# Patient Record
Sex: MALE | Race: WHITE | NOT HISPANIC OR LATINO | Employment: OTHER | URBAN - NONMETROPOLITAN AREA
[De-identification: names, ages, dates, MRNs, and addresses within clinical notes are randomized per-mention and may not be internally consistent; named-entity substitution may affect disease eponyms.]

---

## 2023-08-04 ENCOUNTER — APPOINTMENT (OUTPATIENT)
Dept: CT IMAGING | Facility: OTHER | Age: 78
End: 2023-08-04
Attending: PHYSICIAN ASSISTANT
Payer: MEDICARE

## 2023-08-04 ENCOUNTER — HOSPITAL ENCOUNTER (EMERGENCY)
Facility: OTHER | Age: 78
Discharge: HOME | End: 2023-08-04
Attending: PHYSICIAN ASSISTANT | Admitting: PHYSICIAN ASSISTANT
Payer: MEDICARE

## 2023-08-04 ENCOUNTER — APPOINTMENT (OUTPATIENT)
Dept: GENERAL RADIOLOGY | Facility: OTHER | Age: 78
End: 2023-08-04
Attending: PHYSICIAN ASSISTANT
Payer: MEDICARE

## 2023-08-04 VITALS
OXYGEN SATURATION: 99 % | WEIGHT: 220 LBS | HEIGHT: 71 IN | BODY MASS INDEX: 30.8 KG/M2 | TEMPERATURE: 97.5 F | DIASTOLIC BLOOD PRESSURE: 77 MMHG | HEART RATE: 52 BPM | RESPIRATION RATE: 7 BRPM | SYSTOLIC BLOOD PRESSURE: 173 MMHG

## 2023-08-04 DIAGNOSIS — R07.89 ATYPICAL CHEST PAIN: ICD-10-CM

## 2023-08-04 LAB
ALBUMIN SERPL BCG-MCNC: 4.1 G/DL (ref 3.5–5.2)
ALP SERPL-CCNC: 92 U/L (ref 40–129)
ALT SERPL W P-5'-P-CCNC: 14 U/L (ref 0–70)
ANION GAP SERPL CALCULATED.3IONS-SCNC: 8 MMOL/L (ref 7–15)
AST SERPL W P-5'-P-CCNC: 17 U/L (ref 0–45)
BASOPHILS # BLD AUTO: 0.1 10E3/UL (ref 0–0.2)
BASOPHILS NFR BLD AUTO: 1 %
BILIRUB SERPL-MCNC: 0.7 MG/DL
BUN SERPL-MCNC: 17.3 MG/DL (ref 8–23)
CALCIUM SERPL-MCNC: 9.4 MG/DL (ref 8.8–10.2)
CHLORIDE SERPL-SCNC: 104 MMOL/L (ref 98–107)
CREAT SERPL-MCNC: 1.18 MG/DL (ref 0.67–1.17)
D DIMER PPP FEU-MCNC: 1.46 UG/ML FEU (ref 0–0.5)
DEPRECATED HCO3 PLAS-SCNC: 26 MMOL/L (ref 22–29)
EOSINOPHIL # BLD AUTO: 0.4 10E3/UL (ref 0–0.7)
EOSINOPHIL NFR BLD AUTO: 6 %
ERYTHROCYTE [DISTWIDTH] IN BLOOD BY AUTOMATED COUNT: 13.1 % (ref 10–15)
GFR SERPL CREATININE-BSD FRML MDRD: 64 ML/MIN/1.73M2
GLUCOSE SERPL-MCNC: 165 MG/DL (ref 70–99)
HCT VFR BLD AUTO: 37 % (ref 40–53)
HGB BLD-MCNC: 12.8 G/DL (ref 13.3–17.7)
HOLD SPECIMEN: NORMAL
IMM GRANULOCYTES # BLD: 0 10E3/UL
IMM GRANULOCYTES NFR BLD: 0 %
LACTATE SERPL-SCNC: 1 MMOL/L (ref 0.7–2)
LIPASE SERPL-CCNC: 18 U/L (ref 13–60)
LYMPHOCYTES # BLD AUTO: 2.5 10E3/UL (ref 0.8–5.3)
LYMPHOCYTES NFR BLD AUTO: 35 %
MAGNESIUM SERPL-MCNC: 1.8 MG/DL (ref 1.7–2.3)
MCH RBC QN AUTO: 30.3 PG (ref 26.5–33)
MCHC RBC AUTO-ENTMCNC: 34.6 G/DL (ref 31.5–36.5)
MCV RBC AUTO: 88 FL (ref 78–100)
MONOCYTES # BLD AUTO: 0.7 10E3/UL (ref 0–1.3)
MONOCYTES NFR BLD AUTO: 10 %
NEUTROPHILS # BLD AUTO: 3.5 10E3/UL (ref 1.6–8.3)
NEUTROPHILS NFR BLD AUTO: 48 %
NRBC # BLD AUTO: 0 10E3/UL
NRBC BLD AUTO-RTO: 0 /100
NT-PROBNP SERPL-MCNC: 477 PG/ML (ref 0–1800)
PLATELET # BLD AUTO: 205 10E3/UL (ref 150–450)
POTASSIUM SERPL-SCNC: 4.2 MMOL/L (ref 3.4–5.3)
PROT SERPL-MCNC: 7 G/DL (ref 6.4–8.3)
RBC # BLD AUTO: 4.23 10E6/UL (ref 4.4–5.9)
SODIUM SERPL-SCNC: 138 MMOL/L (ref 136–145)
TROPONIN T SERPL HS-MCNC: 20 NG/L
TROPONIN T SERPL HS-MCNC: 21 NG/L
WBC # BLD AUTO: 7.1 10E3/UL (ref 4–11)

## 2023-08-04 PROCEDURE — 83735 ASSAY OF MAGNESIUM: CPT | Performed by: PHYSICIAN ASSISTANT

## 2023-08-04 PROCEDURE — 85379 FIBRIN DEGRADATION QUANT: CPT | Performed by: PHYSICIAN ASSISTANT

## 2023-08-04 PROCEDURE — 71275 CT ANGIOGRAPHY CHEST: CPT | Mod: MA

## 2023-08-04 PROCEDURE — 83690 ASSAY OF LIPASE: CPT | Performed by: PHYSICIAN ASSISTANT

## 2023-08-04 PROCEDURE — 250N000011 HC RX IP 250 OP 636: Performed by: PHYSICIAN ASSISTANT

## 2023-08-04 PROCEDURE — 36415 COLL VENOUS BLD VENIPUNCTURE: CPT | Performed by: PHYSICIAN ASSISTANT

## 2023-08-04 PROCEDURE — 83605 ASSAY OF LACTIC ACID: CPT | Performed by: PHYSICIAN ASSISTANT

## 2023-08-04 PROCEDURE — 83880 ASSAY OF NATRIURETIC PEPTIDE: CPT | Performed by: PHYSICIAN ASSISTANT

## 2023-08-04 PROCEDURE — 84484 ASSAY OF TROPONIN QUANT: CPT | Mod: 91 | Performed by: PHYSICIAN ASSISTANT

## 2023-08-04 PROCEDURE — 85025 COMPLETE CBC W/AUTO DIFF WBC: CPT | Performed by: PHYSICIAN ASSISTANT

## 2023-08-04 PROCEDURE — 93005 ELECTROCARDIOGRAM TRACING: CPT | Performed by: INTERNAL MEDICINE

## 2023-08-04 PROCEDURE — 93010 ELECTROCARDIOGRAM REPORT: CPT | Performed by: INTERNAL MEDICINE

## 2023-08-04 PROCEDURE — 80053 COMPREHEN METABOLIC PANEL: CPT | Performed by: PHYSICIAN ASSISTANT

## 2023-08-04 PROCEDURE — 99284 EMERGENCY DEPT VISIT MOD MDM: CPT | Performed by: INTERNAL MEDICINE

## 2023-08-04 PROCEDURE — 99291 CRITICAL CARE FIRST HOUR: CPT | Mod: 25 | Performed by: INTERNAL MEDICINE

## 2023-08-04 PROCEDURE — 71045 X-RAY EXAM CHEST 1 VIEW: CPT

## 2023-08-04 RX ORDER — FUROSEMIDE 20 MG
TABLET ORAL
COMMUNITY
Start: 2023-01-26 | End: 2023-11-29 | Stop reason: DRUGHIGH

## 2023-08-04 RX ORDER — ISOSORBIDE MONONITRATE 30 MG/1
30 TABLET, EXTENDED RELEASE ORAL DAILY
Qty: 30 TABLET | Refills: 0 | Status: SHIPPED | OUTPATIENT
Start: 2023-08-04 | End: 2024-06-11

## 2023-08-04 RX ORDER — IOPAMIDOL 755 MG/ML
90 INJECTION, SOLUTION INTRAVASCULAR ONCE
Status: COMPLETED | OUTPATIENT
Start: 2023-08-04 | End: 2023-08-04

## 2023-08-04 RX ORDER — LEVOTHYROXINE SODIUM 200 UG/1
TABLET ORAL
COMMUNITY
End: 2024-08-21

## 2023-08-04 RX ORDER — ATENOLOL 100 MG/1
100 TABLET ORAL DAILY
COMMUNITY
End: 2024-02-20

## 2023-08-04 RX ORDER — LOSARTAN POTASSIUM 100 MG/1
100 TABLET ORAL DAILY
COMMUNITY
End: 2023-11-29

## 2023-08-04 RX ORDER — AMLODIPINE BESYLATE 5 MG/1
TABLET ORAL
COMMUNITY
End: 2024-02-29

## 2023-08-04 RX ORDER — INSULIN GLARGINE 100 [IU]/ML
INJECTION, SOLUTION SUBCUTANEOUS
COMMUNITY
Start: 2023-04-20

## 2023-08-04 RX ORDER — NITROGLYCERIN 0.4 MG/1
0.4 TABLET SUBLINGUAL EVERY 5 MIN PRN
COMMUNITY
End: 2024-06-11

## 2023-08-04 RX ORDER — ATORVASTATIN CALCIUM 10 MG/1
10 TABLET, FILM COATED ORAL DAILY
COMMUNITY
End: 2023-11-29 | Stop reason: DRUGHIGH

## 2023-08-04 RX ORDER — METFORMIN HCL 500 MG
1000 TABLET, EXTENDED RELEASE 24 HR ORAL 2 TIMES DAILY WITH MEALS
COMMUNITY
Start: 2022-10-07 | End: 2024-06-11

## 2023-08-04 RX ADMIN — IOPAMIDOL 90 ML: 755 INJECTION, SOLUTION INTRAVENOUS at 14:59

## 2023-08-04 ASSESSMENT — ACTIVITIES OF DAILY LIVING (ADL)
ADLS_ACUITY_SCORE: 35
ADLS_ACUITY_SCORE: 35

## 2023-08-04 NOTE — ED PROVIDER NOTES
"      EMERGENCY DEPARTMENT ENCOUNTER      NAME: Joe Bearden  AGE: 77 year old male  YOB: 1945  MRN: [id number]  EVALUATION DATE & TIME: 8/4/2023  1:42 PM    PCP: No Ref-Primary, Physician    ED PROVIDER: Butch Gaviria PA-C       CHIEF COMPLAINT:  Chief Complaint   Patient presents with    Chest Pain       FINAL IMPRESSION:  1. Atypical chest pain        ED COURSE, MEDICAL DECISION MAKING, ASSESSMENT, AND PLAN:      The patient was interviewed and examined.  HPI and physical exam as below.  Differential diagnosis and MDM Key Documentation Elements as below.  Vitals and triage note were reviewed.  BP (!) 173/77   Pulse 52   Temp 97.5  F (36.4  C) (Temporal)   Resp (!) 7   Ht 1.803 m (5' 11\")   Wt 99.8 kg (220 lb)   SpO2 99%   BMI 30.68 kg/m      Overall Impression/Treatment Plan/Discharge Info/Follow-up/Medical Necessity for Admission:  Joe Bearden is a pleasant 77 year old male with history of type 2 diabetes, hypertension, and MI who presents to the ER today with his daughter for complaints of chest pain.  Patient states that chest pain has been ongoing for the past week.  Patient states that he has sharp pains in the center of his chest.  Patient states that yesterday and the day before chest pain was pretty bad.  Patient states he is also been complaining of dyspnea especially with exertion.  Patient states that symptoms have been improved with use of nitroglycerin.  Patient also feeling sweaty and lightheaded.  Last MI was from 1011 years ago.  Patient does have 2 cardiac stents placed.  No blood thinners.  Last echocardiogram was performed on 12/18/2020 with ejection fraction of 55 to 60%.  Patient states that he is also been having mild swelling in his lower extremities.  Patient does take Lasix for this.  No recent travel.  Does have a follow-up cardiology appointment September here in [place].    Differential includes but is not limited to ACS/MI, unstable angina, " pneumonia, pneumothorax, chest wall pain, thyroid dysfunction, congestive heart failure    Patient today was afebrile with elevated blood pressure.  Patient was in no acute distress.  Resting comfortably on the exam bed.  Patient was not diaphoretic or ill-appearing.  No chest wall tenderness.  Lungs were clear without wheezing, rhonchi, rales.  Heart was regular without murmurs, gallops, or rubs.  Patient with 1+ edema of the lower extremities bilaterally.  No abdominal tenderness.  Exam today otherwise benign.    Initial EKG showed no ST segment deviation to suggest ACS/MI.  Right bundle branch block was present but this was on prior EKG imaging which is not new.  Patient does have a new bifascicular block.  Initial troponin was 21.  Troponin retest at the 2-hour fernando today was 20, trending down, less likely ACS/MI.  Screening BNP was nonelevated.  No leukocytosis.  Patient with creatinine 1.18.  Hemoglobin 12.8.  Glucose 165.  No electrolyte abnormalities.  Normal hepatic function.  Normal lipase.  Normal lactic acid.  CT of the chest for PE study was unremarkable for signs of acute PE, effusion, pneumonia.  No mass lesion seen.  Chest x-ray negative    Assessment/plan:  Atypical chest pain  -Atypical chest pain versus angina.  No signs of acute ACS/MI.  No signs of acute PE or pneumonia.  Low suspicion for referred pain from acute cholecystitis or pancreatitis.  -Recommend emergent follow-up with cardiology for further evaluation for patient's chest pain.  Continue to use nitroglycerin as needed.  Return to the ER over the weekend if any worsening of symptoms.    Addendum: Spoke with cardiologist Dr. Ceron at [facility].  Recommendation is for patient to be started on Imdur 30 mg daily.  Place referral for patient to be seen at [facility].  May be seen in clinic as an outpatient.  I spoke with patient and patient's daughter about plan. Prescription to be sent to [facility].    2.  Type 2 diabetes  -Glucose  165.  No signs of DKA.  Follow-up primary care.    Reassessments, Medications, Interventions, & Response to Treatments:  Multiple reassessments.  Patient not have any worsening of symptoms.  Patient remained stable here in the ER.  Discussed laboratory imaging results.  Discussed treatment plan and follow-up.    Consultations:  None    Decision Rules, Medical Calculators, and Risk Stratification Tools:  None    MDM Key Documentation Elements for Patient's Evaluation:  Differential diagnosis to include high risk considerations: As above  Escalation to admission/observation considered: Admission/observation considered, but patient does not meet admission criteria  Discussions and management with other clinicians:    3a. Independent interpretation of testing performed by another health professional:  -No  3b. Discussion of management or test interpretation with another health professional: -No  Independent interpretation of tests:  Ordering and/or review of 3+ test(s)  Discussion of test interpretations with radiology:  No  History obtained from source other than patient or assessment requiring an independent historian:  No  Review of non-ED/external records:  review of 3+ records  Diagnostic tests considered but not ultimately performed/deferred:  -Echocardiogram  Prescription medications considered but not prescribed:  -Antibiotics  Chronic conditions affecting care:  -Coronary artery disease  Care affected by social determinants of health:  -None    The patient's management involved:   - Laboratory studies  - Imaging studies      Pertinent Labs & Imaging studies reviewed. (See chart for details)  Results for orders placed or performed during the hospital encounter of 08/04/23   XR Chest Port 1 View    Impression    Impression:   No acute abnormality. No evidence of acute or active cardiopulmonary  disease.    LUCILA BYERS MD         SYSTEM ID:  [id number]   CT Chest Pulmonary Embolism w Contrast    Impression     IMPRESSION:   Healing fractures in the anterior aspect of the left third rib and  right fourth rib.    No pneumothorax.    No evidence of pulmonary embolus.      This facility minimizes radiation dose by adjusting the mA and/or kV  according to each patient size.      This CT scan was performed using one or more the following dose  reduction techniques:    -Automated exposure control,  -Adjustment of the mA and/or kV according to patient's size, and/or,  -Use of iterative reconstruction technique.    LUCILA BYERS MD         SYSTEM ID:  [id number]   D dimer quantitative   Result Value Ref Range    D-Dimer Quantitative 1.46 (H) 0.00 - 0.50 ug/mL FEU   Comprehensive metabolic panel   Result Value Ref Range    Sodium 138 136 - 145 mmol/L    Potassium 4.2 3.4 - 5.3 mmol/L    Chloride 104 98 - 107 mmol/L    Carbon Dioxide (CO2) 26 22 - 29 mmol/L    Anion Gap 8 7 - 15 mmol/L    Urea Nitrogen 17.3 8.0 - 23.0 mg/dL    Creatinine 1.18 (H) 0.67 - 1.17 mg/dL    Calcium 9.4 8.8 - 10.2 mg/dL    Glucose 165 (H) 70 - 99 mg/dL    Alkaline Phosphatase 92 40 - 129 U/L    AST 17 0 - 45 U/L    ALT 14 0 - 70 U/L    Protein Total 7.0 6.4 - 8.3 g/dL    Albumin 4.1 3.5 - 5.2 g/dL    Bilirubin Total 0.7 <=1.2 mg/dL    GFR Estimate 64 >60 mL/min/1.73m2   Result Value Ref Range    Lipase 18 13 - 60 U/L   Lactic acid whole blood   Result Value Ref Range    Lactic Acid 1.0 0.7 - 2.0 mmol/L   Result Value Ref Range    Troponin T, High Sensitivity 21 <=22 ng/L   Result Value Ref Range    Magnesium 1.8 1.7 - 2.3 mg/dL   Nt probnp inpatient (BNP)   Result Value Ref Range    N terminal Pro BNP Inpatient 477 0 - 1,800 pg/mL   CBC with platelets and differential   Result Value Ref Range    WBC Count 7.1 4.0 - 11.0 10e3/uL    RBC Count 4.23 (L) 4.40 - 5.90 10e6/uL    Hemoglobin 12.8 (L) 13.3 - 17.7 g/dL    Hematocrit 37.0 (L) 40.0 - 53.0 %    MCV 88 78 - 100 fL    MCH 30.3 26.5 - 33.0 pg    MCHC 34.6 31.5 - 36.5 g/dL    RDW 13.1 10.0 - 15.0 %     Platelet Count 205 150 - 450 10e3/uL    % Neutrophils 48 %    % Lymphocytes 35 %    % Monocytes 10 %    % Eosinophils 6 %    % Basophils 1 %    % Immature Granulocytes 0 %    NRBCs per 100 WBC 0 <1 /100    Absolute Neutrophils 3.5 1.6 - 8.3 10e3/uL    Absolute Lymphocytes 2.5 0.8 - 5.3 10e3/uL    Absolute Monocytes 0.7 0.0 - 1.3 10e3/uL    Absolute Eosinophils 0.4 0.0 - 0.7 10e3/uL    Absolute Basophils 0.1 0.0 - 0.2 10e3/uL    Absolute Immature Granulocytes 0.0 <=0.4 10e3/uL    Absolute NRBCs 0.0 10e3/uL   Extra Red Top Tube   Result Value Ref Range    Hold Specimen x    Result Value Ref Range    Troponin T, High Sensitivity 20 <=22 ng/L     No results found for: ABORH      A shared decision making model was used. Time was taken to answer all questions.  Patient and/or associated parties understood and were agreeable to treatment plan.  Plan and all results were discussed. Warning signs and close return precautions to return to the ED given. Copy of results given. Discharged in stable condition. Discharged with discharge instructions outlining plan for further care and follow up.        PPE worn during patient evaluation:  Mask: Yes, surgical  Eye Protection: No  Gown: No  Hair cover: No  Face Shield: No  Patient wearing a mask: yes      MEDICATIONS GIVEN IN THE EMERGENCY:  Medications   iopamidol (ISOVUE-370) solution 90 mL (90 mLs Intravenous $Given 8/4/23 9724)       NEW PRESCRIPTIONS STARTED AT TODAY'S ER VISIT:  New Prescriptions    No medications on file          =================================================================    HPI  Joe Bearden is a pleasant 77 year old male with history of type 2 diabetes, hypertension, and MI who presents to the ER today with his daughter for complaints of chest pain.  Patient states that chest pain has been ongoing for the past week.  Patient states that he has sharp pains in the center of his chest.  Patient states that yesterday and the day before chest pain was  "pretty bad.  Patient states he is also been complaining of dyspnea especially with exertion.  Patient states that symptoms have been improved with use of nitroglycerin.  Patient also feeling sweaty and lightheaded.  Last MI was from 1011 years ago.  Patient does have 2 cardiac stents placed.  No blood thinners.  Last echocardiogram was performed on 12/18/2020 with ejection fraction of 55 to 60%.  Patient states that he is also been having mild swelling in his lower extremities.  Patient does take Lasix for this.  No recent travel.  Does have a follow-up cardiology appointment September here in [place].      REVIEW OF SYSTEMS   Review of Systems  As above, otherwise ROS is unremarkable.      PAST MEDICAL HISTORY:  No past medical history on file.    PAST SURGICAL HISTORY:  No past surgical history on file.    CURRENT MEDICATIONS:    Current Outpatient Medications   Medication Instructions    amLODIPine (NORVASC) 5 MG tablet TAKE 1 TABLET (5 MG) BY MOUTH TWO TIMES DAILY.    atenolol (TENORMIN) 100 mg, Oral, DAILY    atorvastatin (LIPITOR) 10 mg, Oral, DAILY    furosemide (LASIX) 20 MG tablet TAKE 1 TABLET (20 MG) BY MOUTH EVERY MORNING. THIS IS 20 MG, NOT 40. DO NOT NEED TO CUT THIS.    insulin glargine (BASAGLAR KWIKPEN) 100 UNIT/ML pen INJECT 10 UNITS SUBCUTANEOUS BEFORE BEDTIME. REPLACES MIXED INSULIN, START BASAGLAR WHEN STARTING TRULICITY    levothyroxine (SYNTHROID/LEVOTHROID) 200 MCG tablet TAKE 1 TABLET (200 MCG) BY MOUTH BEFORE BREAKFAST.    losartan (COZAAR) 100 mg, Oral, DAILY    metFORMIN (GLUCOPHAGE XR) 1,000 mg, Oral, 2 TIMES DAILY WITH MEALS       ALLERGIES:  Allergies   Allergen Reactions    Gabapentin GI Disturbance       FAMILY HISTORY:  No family history on file.    SOCIAL HISTORY:   Social History     Socioeconomic History    Marital status:        PHYSICAL EXAM    VITAL SIGNS: BP (!) 173/77   Pulse 52   Temp 97.5  F (36.4  C) (Temporal)   Resp (!) 7   Ht 1.803 m (5' 11\")   Wt 99.8 " "kg (220 lb)   SpO2 99%   BMI 30.68 kg/m      Patient Vitals for the past 24 hrs:   BP Temp Temp src Pulse Resp SpO2 Height Weight   08/04/23 1700 (!) 173/77 -- -- 52 (!) 7 99 % -- --   08/04/23 1600 (!) 178/87 -- -- 50 16 98 % -- --   08/04/23 1530 (!) 181/81 -- -- 50 -- 96 % -- --   08/04/23 1515 (!) 176/74 -- -- 50 18 93 % -- --   08/04/23 1507 (!) 172/58 -- -- 54 10 -- -- --   08/04/23 1344 (!) 183/76 97.5  F (36.4  C) Temporal 58 16 96 % 1.803 m (5' 11\") 99.8 kg (220 lb)       Physical Exam         LABS & RADIOLOGY:  All pertinent labs reviewed and interpreted. Reviewed all pertinent imaging. Please see official radiology report.  Results for orders placed or performed during the hospital encounter of 08/04/23   XR Chest Port 1 View    Impression    Impression:   No acute abnormality. No evidence of acute or active cardiopulmonary  disease.    LUCILA BYERS MD         SYSTEM ID:  [id number]   CT Chest Pulmonary Embolism w Contrast    Impression    IMPRESSION:   Healing fractures in the anterior aspect of the left third rib and  right fourth rib.    No pneumothorax.    No evidence of pulmonary embolus.      This facility minimizes radiation dose by adjusting the mA and/or kV  according to each patient size.      This CT scan was performed using one or more the following dose  reduction techniques:    -Automated exposure control,  -Adjustment of the mA and/or kV according to patient's size, and/or,  -Use of iterative reconstruction technique.    LUCILA BYERS MD         SYSTEM ID:  [id number]   D dimer quantitative   Result Value Ref Range    D-Dimer Quantitative 1.46 (H) 0.00 - 0.50 ug/mL FEU   Comprehensive metabolic panel   Result Value Ref Range    Sodium 138 136 - 145 mmol/L    Potassium 4.2 3.4 - 5.3 mmol/L    Chloride 104 98 - 107 mmol/L    Carbon Dioxide (CO2) 26 22 - 29 mmol/L    Anion Gap 8 7 - 15 mmol/L    Urea Nitrogen 17.3 8.0 - 23.0 mg/dL    Creatinine 1.18 (H) 0.67 - 1.17 mg/dL    Calcium 9.4 " 8.8 - 10.2 mg/dL    Glucose 165 (H) 70 - 99 mg/dL    Alkaline Phosphatase 92 40 - 129 U/L    AST 17 0 - 45 U/L    ALT 14 0 - 70 U/L    Protein Total 7.0 6.4 - 8.3 g/dL    Albumin 4.1 3.5 - 5.2 g/dL    Bilirubin Total 0.7 <=1.2 mg/dL    GFR Estimate 64 >60 mL/min/1.73m2   Result Value Ref Range    Lipase 18 13 - 60 U/L   Lactic acid whole blood   Result Value Ref Range    Lactic Acid 1.0 0.7 - 2.0 mmol/L   Result Value Ref Range    Troponin T, High Sensitivity 21 <=22 ng/L   Result Value Ref Range    Magnesium 1.8 1.7 - 2.3 mg/dL   Nt probnp inpatient (BNP)   Result Value Ref Range    N terminal Pro BNP Inpatient 477 0 - 1,800 pg/mL   CBC with platelets and differential   Result Value Ref Range    WBC Count 7.1 4.0 - 11.0 10e3/uL    RBC Count 4.23 (L) 4.40 - 5.90 10e6/uL    Hemoglobin 12.8 (L) 13.3 - 17.7 g/dL    Hematocrit 37.0 (L) 40.0 - 53.0 %    MCV 88 78 - 100 fL    MCH 30.3 26.5 - 33.0 pg    MCHC 34.6 31.5 - 36.5 g/dL    RDW 13.1 10.0 - 15.0 %    Platelet Count 205 150 - 450 10e3/uL    % Neutrophils 48 %    % Lymphocytes 35 %    % Monocytes 10 %    % Eosinophils 6 %    % Basophils 1 %    % Immature Granulocytes 0 %    NRBCs per 100 WBC 0 <1 /100    Absolute Neutrophils 3.5 1.6 - 8.3 10e3/uL    Absolute Lymphocytes 2.5 0.8 - 5.3 10e3/uL    Absolute Monocytes 0.7 0.0 - 1.3 10e3/uL    Absolute Eosinophils 0.4 0.0 - 0.7 10e3/uL    Absolute Basophils 0.1 0.0 - 0.2 10e3/uL    Absolute Immature Granulocytes 0.0 <=0.4 10e3/uL    Absolute NRBCs 0.0 10e3/uL   Extra Red Top Tube   Result Value Ref Range    Hold Specimen x    Result Value Ref Range    Troponin T, High Sensitivity 20 <=22 ng/L     CT Chest Pulmonary Embolism w Contrast   Final Result   IMPRESSION:    Healing fractures in the anterior aspect of the left third rib and   right fourth rib.      No pneumothorax.      No evidence of pulmonary embolus.         This facility minimizes radiation dose by adjusting the mA and/or kV   according to each patient size.          This CT scan was performed using one or more the following dose   reduction techniques:      -Automated exposure control,   -Adjustment of the mA and/or kV according to patient's size, and/or,   -Use of iterative reconstruction technique.      LUCILA BYERS MD            SYSTEM ID:  [id number]      XR Chest Port 1 View   Final Result   Impression:    No acute abnormality. No evidence of acute or active cardiopulmonary   disease.      LUCILA BYERS MD            SYSTEM ID:  [id number]            EK2020 EKG available for comparison. EKG reviewed at 1351.  1) Rhythm: Sinus bradycardia  2) Rate: ventricular rate 55 bpm.  3) QRS Axis: No axis deviation  4) P waves/ MI interval: Sinus. Nml JORDAN. No atrial enlargement  5) QRS complex: Narrow. Right BBB (not new). Potential left ventricular hypertrophy. No pathological Q waves.  Bifascicular block (new)  6) ST Segment: No acute ST segment elevation or depression  7) T waves: No T wave inversions. No peaked or flattened T waves.     I have independently reviewed and interpreted today's EKG, pending cardiologist over read.         I, Juwan Gaviria PA-C, personally performed the services described in this documentation, and it is both accurate and complete.       Butch Gaviria PA-C  23 [id number]       Butch Gaviria PA-C  23

## 2023-08-04 NOTE — PROGRESS NOTES
1.  Has the patient had a previous reaction to IV contrast? No    2.  Does the patient have kidney disease? No    3.  Is the patient on dialysis? No    If YES to any of these questions, exam will be reviewed with a Radiologist before administering contrast.  IV Contrast- Discharge Instructions After Your CT Scan      The IV contrast you received today will be filtered from your bloodstream by your kidneys during the next 24 hours and pass from the body in urine.  You will not be aware of this process and your urine will not change in color.  To help this process you should drink at least 4 additional glasses of water or juice today.  This reduces stress on your kidneys.    Most contrast reactions are immediate.  Should you develop symptoms of concern after discharge, contact the department at the number below.  After hours you should contact your personal physician.  If you develop breathing distress or wheezing, call 911.

## 2023-08-04 NOTE — DISCHARGE INSTRUCTIONS
-Follow-up with cardiology soon as possible, call Monday for appointment.  I did place an emergency referral for follow-up.  -Recommend no exertional activities until otherwise cleared by cardiology.  There is no signs of acute heart attack today and no signs of blood clots in your lung, pneumonia, or any abnormal findings on your work-up.  -Continue to follow-up with your primary care doctor for management of your diabetes.

## 2023-08-04 NOTE — ED TRIAGE NOTES
Pt presents to Ed with c/o chest pain. Intermittent. Pt reports worsening pain with activity. Pt has been taking nitroglycerin. Pt reports h/o MI and stent placement.    Patria Bailey RN on 8/4/2023 at 1:49 PM       Triage Assessment       Row Name 08/04/23 1348       Skin Circulation/Temperature WDL    Skin Circulation/Temperature WDL WDL       Cardiac WDL    Cardiac WDL X;all       Chest Pain Assessment    Chest Pain Location midsternal    Chest Pain Radiation arm    Character aching    Associated Signs/Symptoms diaphoresis    Chest Pain Intervention cardiac monitor placed;12-lead ECG obtained;activity minimized

## 2023-08-09 LAB
ATRIAL RATE - MUSE: 55 BPM
DIASTOLIC BLOOD PRESSURE - MUSE: NORMAL MMHG
INTERPRETATION ECG - MUSE: NORMAL
P AXIS - MUSE: 45 DEGREES
PR INTERVAL - MUSE: 190 MS
QRS DURATION - MUSE: 176 MS
QT - MUSE: 502 MS
QTC - MUSE: 480 MS
R AXIS - MUSE: -66 DEGREES
SYSTOLIC BLOOD PRESSURE - MUSE: NORMAL MMHG
T AXIS - MUSE: -2 DEGREES
VENTRICULAR RATE- MUSE: 55 BPM

## 2023-08-18 ENCOUNTER — TRANSFERRED RECORDS (OUTPATIENT)
Dept: HEALTH INFORMATION MANAGEMENT | Facility: OTHER | Age: 78
End: 2023-08-18

## 2023-10-06 ENCOUNTER — TRANSFERRED RECORDS (OUTPATIENT)
Dept: HEALTH INFORMATION MANAGEMENT | Facility: OTHER | Age: 78
End: 2023-10-06

## 2023-10-06 LAB — EJECTION FRACTION: =>55 %

## 2023-11-29 ENCOUNTER — OFFICE VISIT (OUTPATIENT)
Dept: FAMILY MEDICINE | Facility: OTHER | Age: 78
End: 2023-11-29
Attending: STUDENT IN AN ORGANIZED HEALTH CARE EDUCATION/TRAINING PROGRAM
Payer: COMMERCIAL

## 2023-11-29 VITALS
OXYGEN SATURATION: 97 % | RESPIRATION RATE: 16 BRPM | TEMPERATURE: 97 F | SYSTOLIC BLOOD PRESSURE: 128 MMHG | WEIGHT: 230 LBS | DIASTOLIC BLOOD PRESSURE: 70 MMHG | BODY MASS INDEX: 32.2 KG/M2 | HEIGHT: 71 IN | HEART RATE: 45 BPM

## 2023-11-29 DIAGNOSIS — G47.411 PRIMARY NARCOLEPSY WITH CATAPLEXY: ICD-10-CM

## 2023-11-29 DIAGNOSIS — Z79.4 TYPE 2 DIABETES MELLITUS WITH OTHER SPECIFIED COMPLICATION, WITH LONG-TERM CURRENT USE OF INSULIN (H): Primary | ICD-10-CM

## 2023-11-29 DIAGNOSIS — Z79.4 TYPE 2 DIABETES MELLITUS WITHOUT COMPLICATION, WITH LONG-TERM CURRENT USE OF INSULIN (H): ICD-10-CM

## 2023-11-29 DIAGNOSIS — E11.9 TYPE 2 DIABETES MELLITUS WITHOUT COMPLICATION, WITH LONG-TERM CURRENT USE OF INSULIN (H): ICD-10-CM

## 2023-11-29 DIAGNOSIS — E11.69 TYPE 2 DIABETES MELLITUS WITH OTHER SPECIFIED COMPLICATION, WITH LONG-TERM CURRENT USE OF INSULIN (H): Primary | ICD-10-CM

## 2023-11-29 DIAGNOSIS — I10 PRIMARY HYPERTENSION: ICD-10-CM

## 2023-11-29 DIAGNOSIS — G21.9 SECONDARY PARKINSONISM, UNSPECIFIED SECONDARY PARKINSONISM TYPE (H): ICD-10-CM

## 2023-11-29 DIAGNOSIS — M10.9 GOUT, UNSPECIFIED CAUSE, UNSPECIFIED CHRONICITY, UNSPECIFIED SITE: ICD-10-CM

## 2023-11-29 DIAGNOSIS — F11.90 CHRONIC, CONTINUOUS USE OF OPIOIDS: ICD-10-CM

## 2023-11-29 DIAGNOSIS — E11.51 TYPE 2 DIABETES MELLITUS WITH PERIPHERAL VASCULAR DISEASE (H): ICD-10-CM

## 2023-11-29 DIAGNOSIS — F90.2 ATTENTION DEFICIT HYPERACTIVITY DISORDER (ADHD), COMBINED TYPE: ICD-10-CM

## 2023-11-29 DIAGNOSIS — F22 DELUSIONAL DISORDER (H): ICD-10-CM

## 2023-11-29 DIAGNOSIS — F11.20 CHRONIC NARCOTIC DEPENDENCE (H): ICD-10-CM

## 2023-11-29 DIAGNOSIS — G89.4 CHRONIC PAIN SYNDROME: ICD-10-CM

## 2023-11-29 DIAGNOSIS — E66.01 MORBID (SEVERE) OBESITY DUE TO EXCESS CALORIES (H): ICD-10-CM

## 2023-11-29 LAB
ALBUMIN SERPL BCG-MCNC: 4.3 G/DL (ref 3.5–5.2)
ALP SERPL-CCNC: 93 U/L (ref 40–150)
ALT SERPL W P-5'-P-CCNC: 19 U/L (ref 0–70)
ANION GAP SERPL CALCULATED.3IONS-SCNC: 9 MMOL/L (ref 7–15)
AST SERPL W P-5'-P-CCNC: 18 U/L (ref 0–45)
BASOPHILS # BLD AUTO: 0.1 10E3/UL (ref 0–0.2)
BASOPHILS NFR BLD AUTO: 1 %
BILIRUB SERPL-MCNC: 0.5 MG/DL
BUN SERPL-MCNC: 19.1 MG/DL (ref 8–23)
CALCIUM SERPL-MCNC: 9.1 MG/DL (ref 8.8–10.2)
CHLORIDE SERPL-SCNC: 104 MMOL/L (ref 98–107)
CHOLEST SERPL-MCNC: 121 MG/DL
CREAT SERPL-MCNC: 1.24 MG/DL (ref 0.67–1.17)
CREAT UR-MCNC: 234.2 MG/DL
DEPRECATED HCO3 PLAS-SCNC: 26 MMOL/L (ref 22–29)
EGFRCR SERPLBLD CKD-EPI 2021: 60 ML/MIN/1.73M2
EOSINOPHIL # BLD AUTO: 0.4 10E3/UL (ref 0–0.7)
EOSINOPHIL NFR BLD AUTO: 5 %
ERYTHROCYTE [DISTWIDTH] IN BLOOD BY AUTOMATED COUNT: 12.7 % (ref 10–15)
GLUCOSE SERPL-MCNC: 161 MG/DL (ref 70–99)
HBA1C MFR BLD: 6.9 % (ref 4–6.2)
HCT VFR BLD AUTO: 36.2 % (ref 40–53)
HDLC SERPL-MCNC: 49 MG/DL
HGB BLD-MCNC: 12.8 G/DL (ref 13.3–17.7)
IMM GRANULOCYTES # BLD: 0 10E3/UL
IMM GRANULOCYTES NFR BLD: 0 %
LDLC SERPL CALC-MCNC: 54 MG/DL
LYMPHOCYTES # BLD AUTO: 2.3 10E3/UL (ref 0.8–5.3)
LYMPHOCYTES NFR BLD AUTO: 29 %
MCH RBC QN AUTO: 31.1 PG (ref 26.5–33)
MCHC RBC AUTO-ENTMCNC: 35.4 G/DL (ref 31.5–36.5)
MCV RBC AUTO: 88 FL (ref 78–100)
MICROALBUMIN UR-MCNC: 123.8 MG/L
MICROALBUMIN/CREAT UR: 52.86 MG/G CR (ref 0–17)
MONOCYTES # BLD AUTO: 0.6 10E3/UL (ref 0–1.3)
MONOCYTES NFR BLD AUTO: 7 %
NEUTROPHILS # BLD AUTO: 4.6 10E3/UL (ref 1.6–8.3)
NEUTROPHILS NFR BLD AUTO: 58 %
NONHDLC SERPL-MCNC: 72 MG/DL
NRBC # BLD AUTO: 0 10E3/UL
NRBC BLD AUTO-RTO: 0 /100
PLATELET # BLD AUTO: 212 10E3/UL (ref 150–450)
POTASSIUM SERPL-SCNC: 4.5 MMOL/L (ref 3.4–5.3)
PROT SERPL-MCNC: 6.7 G/DL (ref 6.4–8.3)
RBC # BLD AUTO: 4.11 10E6/UL (ref 4.4–5.9)
SODIUM SERPL-SCNC: 139 MMOL/L (ref 135–145)
TRIGL SERPL-MCNC: 88 MG/DL
WBC # BLD AUTO: 7.8 10E3/UL (ref 4–11)

## 2023-11-29 PROCEDURE — G0463 HOSPITAL OUTPT CLINIC VISIT: HCPCS

## 2023-11-29 PROCEDURE — 82570 ASSAY OF URINE CREATININE: CPT | Mod: ZL | Performed by: STUDENT IN AN ORGANIZED HEALTH CARE EDUCATION/TRAINING PROGRAM

## 2023-11-29 PROCEDURE — 36415 COLL VENOUS BLD VENIPUNCTURE: CPT | Mod: ZL | Performed by: STUDENT IN AN ORGANIZED HEALTH CARE EDUCATION/TRAINING PROGRAM

## 2023-11-29 PROCEDURE — 80061 LIPID PANEL: CPT | Mod: ZL | Performed by: STUDENT IN AN ORGANIZED HEALTH CARE EDUCATION/TRAINING PROGRAM

## 2023-11-29 PROCEDURE — 83036 HEMOGLOBIN GLYCOSYLATED A1C: CPT | Mod: ZL | Performed by: STUDENT IN AN ORGANIZED HEALTH CARE EDUCATION/TRAINING PROGRAM

## 2023-11-29 PROCEDURE — 85025 COMPLETE CBC W/AUTO DIFF WBC: CPT | Mod: ZL | Performed by: STUDENT IN AN ORGANIZED HEALTH CARE EDUCATION/TRAINING PROGRAM

## 2023-11-29 PROCEDURE — 80053 COMPREHEN METABOLIC PANEL: CPT | Mod: ZL | Performed by: STUDENT IN AN ORGANIZED HEALTH CARE EDUCATION/TRAINING PROGRAM

## 2023-11-29 PROCEDURE — 99204 OFFICE O/P NEW MOD 45 MIN: CPT | Performed by: STUDENT IN AN ORGANIZED HEALTH CARE EDUCATION/TRAINING PROGRAM

## 2023-11-29 RX ORDER — FOLIC ACID 1 MG/1
1 TABLET ORAL DAILY
COMMUNITY
Start: 2023-11-18 | End: 2024-08-21

## 2023-11-29 RX ORDER — METHYLPHENIDATE HYDROCHLORIDE 20 MG/1
20 TABLET ORAL DAILY
Qty: 30 TABLET | Refills: 0 | Status: SHIPPED | OUTPATIENT
Start: 2024-01-30 | End: 2024-02-29

## 2023-11-29 RX ORDER — ALLOPURINOL 100 MG/1
1 TABLET ORAL DAILY
COMMUNITY
Start: 2023-01-26 | End: 2023-11-29

## 2023-11-29 RX ORDER — LOPERAMIDE HCL 2 MG
CAPSULE ORAL
COMMUNITY
End: 2024-03-18

## 2023-11-29 RX ORDER — LANOLIN ALCOHOL/MO/W.PET/CERES
1000 CREAM (GRAM) TOPICAL DAILY
COMMUNITY
End: 2024-03-18

## 2023-11-29 RX ORDER — OXYCODONE AND ACETAMINOPHEN 5; 325 MG/1; MG/1
TABLET ORAL
COMMUNITY
Start: 2023-10-26 | End: 2023-11-29

## 2023-11-29 RX ORDER — NORTRIPTYLINE HCL 10 MG
10 CAPSULE ORAL AT BEDTIME
COMMUNITY
End: 2023-12-27

## 2023-11-29 RX ORDER — DEXTROAMPHETAMINE SACCHARATE, AMPHETAMINE ASPARTATE, DEXTROAMPHETAMINE SULFATE AND AMPHETAMINE SULFATE 5; 5; 5; 5 MG/1; MG/1; MG/1; MG/1
20 TABLET ORAL
COMMUNITY
Start: 2023-10-26 | End: 2023-12-27

## 2023-11-29 RX ORDER — OXYCODONE AND ACETAMINOPHEN 5; 325 MG/1; MG/1
1 TABLET ORAL 2 TIMES DAILY PRN
Qty: 55 TABLET | Refills: 0 | Status: SHIPPED | OUTPATIENT
Start: 2023-11-29 | End: 2024-02-29

## 2023-11-29 RX ORDER — DULAGLUTIDE 1.5 MG/.5ML
1.5 INJECTION, SOLUTION SUBCUTANEOUS
COMMUNITY
Start: 2023-07-20 | End: 2023-11-29

## 2023-11-29 RX ORDER — FUROSEMIDE 40 MG
40 TABLET ORAL
COMMUNITY
Start: 2023-04-20 | End: 2024-02-20

## 2023-11-29 RX ORDER — UBIDECARENONE 100 MG
200 CAPSULE ORAL
COMMUNITY
Start: 2023-07-20

## 2023-11-29 RX ORDER — OXYCODONE AND ACETAMINOPHEN 5; 325 MG/1; MG/1
1 TABLET ORAL 2 TIMES DAILY PRN
Qty: 55 TABLET | Refills: 0 | Status: SHIPPED | OUTPATIENT
Start: 2024-01-24 | End: 2024-02-29

## 2023-11-29 RX ORDER — METHYLPHENIDATE HYDROCHLORIDE 20 MG/1
20 TABLET ORAL
COMMUNITY
Start: 2023-10-26 | End: 2024-02-29

## 2023-11-29 RX ORDER — MAGNESIUM OXIDE 400 MG/1
TABLET ORAL
COMMUNITY
Start: 2023-10-25 | End: 2024-08-21

## 2023-11-29 RX ORDER — ALLOPURINOL 100 MG/1
100 TABLET ORAL DAILY
Qty: 90 TABLET | Refills: 3 | Status: SHIPPED | OUTPATIENT
Start: 2023-11-29

## 2023-11-29 RX ORDER — METHYLPHENIDATE HYDROCHLORIDE 20 MG/1
20 TABLET ORAL DAILY
Qty: 30 TABLET | Refills: 0 | Status: SHIPPED | OUTPATIENT
Start: 2023-12-30 | End: 2024-01-29

## 2023-11-29 RX ORDER — PANTOPRAZOLE SODIUM 20 MG/1
1 TABLET, DELAYED RELEASE ORAL DAILY
COMMUNITY
Start: 2023-01-26 | End: 2024-02-20

## 2023-11-29 RX ORDER — METHYLPHENIDATE HYDROCHLORIDE 20 MG/1
20 TABLET ORAL DAILY
Qty: 30 TABLET | Refills: 0 | Status: SHIPPED | OUTPATIENT
Start: 2023-11-29 | End: 2023-12-29

## 2023-11-29 RX ORDER — OXYCODONE AND ACETAMINOPHEN 5; 325 MG/1; MG/1
1 TABLET ORAL 2 TIMES DAILY PRN
Qty: 55 TABLET | Refills: 0 | Status: SHIPPED | OUTPATIENT
Start: 2023-12-27 | End: 2024-02-29

## 2023-11-29 RX ORDER — ROPINIROLE 2 MG/1
1 TABLET, FILM COATED ORAL AT BEDTIME
COMMUNITY
Start: 2023-11-18 | End: 2023-12-27

## 2023-11-29 RX ORDER — DULAGLUTIDE 1.5 MG/.5ML
1.5 INJECTION, SOLUTION SUBCUTANEOUS
Qty: 5 ML | Refills: 4 | Status: SHIPPED | OUTPATIENT
Start: 2023-11-29

## 2023-11-29 RX ORDER — ONDANSETRON 4 MG/1
TABLET, FILM COATED ORAL
COMMUNITY
Start: 2022-08-30

## 2023-11-29 RX ORDER — FLUOXETINE 40 MG/1
1 CAPSULE ORAL EVERY MORNING
COMMUNITY
Start: 2023-04-20 | End: 2024-08-21

## 2023-11-29 RX ORDER — LOSARTAN POTASSIUM 100 MG/1
100 TABLET ORAL DAILY
Qty: 90 TABLET | Refills: 3 | Status: SHIPPED | OUTPATIENT
Start: 2023-11-29

## 2023-11-29 RX ORDER — SILDENAFIL 100 MG/1
TABLET, FILM COATED ORAL
COMMUNITY
Start: 2023-01-26 | End: 2024-06-11

## 2023-11-29 RX ORDER — ATORVASTATIN CALCIUM 40 MG/1
TABLET, FILM COATED ORAL
COMMUNITY
Start: 2023-11-17 | End: 2024-08-21

## 2023-11-29 ASSESSMENT — PAIN SCALES - GENERAL: PAINLEVEL: MILD PAIN (3)

## 2023-11-29 NOTE — PROGRESS NOTES
Assessment & Plan     Type 2 diabetes mellitus with other specified complication, with long-term current use of insulin (H)  Type 2 diabetes mellitus with peripheral vascular disease (H)  Referral for eye exam. A1c at goal. Refilled trulicity.   - Comprehensive Metabolic Panel; Future  - Hemoglobin A1c; Future  - Lipid Panel; Future  - CBC and Differential; Future  - TRULICITY 1.5 MG/0.5ML pen; Inject 1.5 mg Subcutaneous every 7 days  - Adult Eye  Referral; Future  - Albumin Random Urine Quantitative with Creat Ratio; Future  - Albumin Random Urine Quantitative with Creat Ratio  - CBC and Differential  - Lipid Panel  - Hemoglobin A1c  - Comprehensive Metabolic Panel    Gout, unspecified cause, unspecified chronicity, unspecified site  No recent flare. Refilled   - allopurinol (ZYLOPRIM) 100 MG tablet; Take 1 tablet (100 mg) by mouth daily    Primary hypertension  Norotensive today. Refilled. Continue for kidney protection  - losartan (COZAAR) 100 MG tablet; Take 1 tablet (100 mg) by mouth daily    Primary narcolepsy with cataplexy  Attention deficit hyperactivity disorder (ADHD), combined type  Revewed  and appropriate. Refilled.   - methylphenidate (RITALIN) 20 MG tablet; Take 1 tablet (20 mg) by mouth daily for 30 days  - methylphenidate (RITALIN) 20 MG tablet; Take 1 tablet (20 mg) by mouth daily for 30 days  - methylphenidate (RITALIN) 20 MG tablet; Take 1 tablet (20 mg) by mouth daily for 30 days    Chronic pain syndrome  Chronic, continuous use of opioids  Chronic narcotic dependence (H)  Chronic use for multiple areas of pain, in particular left shoulder and low back. Reviewed  and appropriate. Filled out controlled substance contract today. Refilled percocet 5-325 mg BID PRN for severe pain, given 55 tabs, x3 Rx. Follow up in 3 months given narcan   - naloxone (NARCAN) 4 MG/0.1ML nasal spray; Spray 1 spray (4 mg) into one nostril alternating nostrils once as needed for opioid reversal every  "2-3 minutes until assistance arrives    Secondary parkinsonism, unspecified secondary Parkinsonism type (H)  Chronic stable.     Delusional disorder (H)  Chronic, no flare      Morbid (severe) obesity due to excess calories (H)  Chronic, complicates diabetes             BMI:   Estimated body mass index is 32.08 kg/m  as calculated from the following:    Height as of this encounter: 1.803 m (5' 11\").    Weight as of this encounter: 104.3 kg (230 lb).           Return in about 3 months (around 2/29/2024) for pain med refill .    Juan Otero MD  [facility]    Monica Diaz is a 77 year old, presenting for the following health issues:  Diabetes (Check up)      History of Present Illness       He eats 0-1 servings of fruits and vegetables daily.He consumes 3 sweetened beverage(s) daily.He exercises with enough effort to increase his heart rate 30 to 60 minutes per day.  He exercises with enough effort to increase his heart rate 7 days per week. He is missing 2 dose(s) of medications per week.  He is not taking prescribed medications regularly due to remembering to take.       Here to establish care   Was previously seen at [facility].   Needs med refills   Diabetes-- needs trulicity refill. Has been over a year since last eye exam                 Review of Systems         Objective    /70 (BP Location: Right arm, Patient Position: Sitting, Cuff Size: Adult Large)   Pulse (!) 45   Temp 97  F (36.1  C) (Tympanic)   Resp 16   Ht 1.803 m (5' 11\")   Wt 104.3 kg (230 lb)   SpO2 97%   BMI 32.08 kg/m    Body mass index is 32.08 kg/m .  Physical Exam                         "

## 2023-11-29 NOTE — LETTER
Opioid / Opioid Plus Controlled Substance Agreement    This is an agreement between you and your provider about the safe and appropriate use of controlled substance/opioids prescribed by your care team. Controlled substances are medicines that can cause physical and mental dependence (abuse).    There are strict laws about having and using these medicines. We here at [facility] are committing to working with you in your efforts to get better. To support you in this work, we ll help you schedule regular office appointments for medicine refills. If we must cancel or change your appointment for any reason, we ll make sure you have enough medicine to last until your next appointment.     As a Provider, I will:  Listen carefully to your concerns and treat you with respect.   Recommend a treatment plan that I believe is in your best interest. This plan may involve therapies other than opioid pain medication.   Talk with you often about the possible benefits, and the risk of harm of any medicine that we prescribe for you.   Provide a plan on how to taper (discontinue or go off) using this medicine if the decision is made to stop its use.    As a Patient, I understand that opioid(s):   Are a controlled substance prescribed by my care team to help me function or work and manage my condition(s).   Are strong medicines and can cause serious side effects such as:  Drowsiness, which can seriously affect my driving ability  A lower breathing rate, enough to cause death  Harm to my thinking ability   Depression   Abuse of and addiction to this medicine  Need to be taken exactly as prescribed. Combining opioids with certain medicines or chemicals (such as illegal drugs, sedatives, sleeping pills, and benzodiazepines) can be dangerous or even fatal. If I stop opioids suddenly, I may have severe withdrawal symptoms.  Do not work for all types of pain nor for all patients. If they re not helpful, I may be asked to stop  them.        The risks, benefits and side effects of these medicine(s) were explained to me. I agree that:  I will take part in other treatments as advised by my care team. This may be psychiatry or counseling, physical therapy, behavioral therapy, group treatment or a referral to a specialist.     I will keep all my appointments. I understand that this is part of the monitoring of opioids. My care team may require an office visit for EVERY opioid/controlled substance refill. If I miss appointments or don t follow instructions, my care team may stop my medicine.    I will take my medicines as prescribed. I will not change the dose or schedule unless my care team tells me to. There will be no refills if I run out early.     I may be asked to come to the clinic and complete a urine drug test or complete a pill count at any time. If I don t give a urine sample or participate in a pill count, the care team may stop my medicine.    I will only receive prescriptions from this clinic for chronic pain. If I am treated by another provider for acute pain issues, I will tell them that I am taking opioid pain medication for chronic pain and that I have a treatment agreement with this provider. I will inform my [facility] care team within one business day if I am given a prescription for any pain medication by another healthcare provider. My [facility] care team can contact other providers and pharmacists about my use of any medicines.    It is up to me to make sure that I don t run out of my medicines on weekends or holidays. If my care team is willing to refill my opioid prescription without a visit, I must request refills only during office hours. Refills may take up to 3 business days to process. I will use one pharmacy to fill all my opioid and other controlled substance prescriptions. I will notify the clinic about any changes to my insurance or medication availability.    I am responsible for my  prescriptions. If the medicine/prescription is lost, stolen or destroyed, it will not be replaced. I also agree not to share controlled substance medicines with anyone.    I am aware I should not use any illegal or recreational drugs. I agree not to drink alcohol unless my care team says I can.       If I enroll in the [place] [facility] program, I will tell my care team prior to my next refill.     I will tell my care team right away if I become pregnant, have a new medical problem treated outside of my regular clinic, or have a change in my medications.    I understand that this medicine can affect my thinking, judgment and reaction time. Alcohol and drugs affect the brain and body, which can affect the safety of my driving. Being under the influence of alcohol or drugs can affect my decision-making, behaviors, personal safety, and the safety of others. Driving while impaired (DWI) can occur if a person is driving, operating, or in physical control of a car, motorcycle, boat, snowmobile, ATV, motorbike, off-road vehicle, or any other motor vehicle ([place] Statute 169A.20). I understand the risk if I choose to drive or operate any vehicle or machinery.    I understand that if I do not follow any of the conditions above, my prescriptions or treatment may be stopped or changed.          Opioids  What You Need to Know    What are opioids?   Opioids are pain medicines that must be prescribed by a doctor. They are also known as narcotics.     Examples are:   morphine (MS Contin, Michell)  oxycodone (Oxycontin)  oxycodone and acetaminophen (Percocet)  hydrocodone and acetaminophen (Vicodin, Norco)   fentanyl patch (Duragesic)   hydromorphone (Dilaudid)   methadone  codeine (Tylenol #3)     What do opioids do well?   Opioids are best for severe short-term pain such as after a surgery or injury. They may work well for cancer pain. They may help some people with long-lasting (chronic) pain.     What do opioids NOT do  well?   Opioids never get rid of pain entirely, and they don t work well for most patients with chronic pain. Opioids don t reduce swelling, one of the causes of pain.                                    Other ways to manage chronic pain and improve function include:     Treat the health problem that may be causing pain  Anti-inflammation medicines, which reduce swelling and tenderness, such as ibuprofen (Advil, Motrin) or naproxen (Aleve)  Acetaminophen (Tylenol)  Antidepressants and anti-seizure medicines, especially for nerve pain  Topical treatments such as patches or creams  Injections or nerve blocks  Chiropractic or osteopathic treatment  Acupuncture, massage, deep breathing, meditation, visual imagery, aromatherapy  Use heat or ice at the pain site  Physical therapy   Exercise  Stop smoking  Take part in therapy       Risks and side effects     Talk to your doctor before you start or decide to keep taking opioids. Possible side effects include:    Lowering your breathing rate enough to cause death  Overdose, including death, especially if taking higher than prescribed doses  Worse depression symptoms; less pleasure in things you usually enjoy  Feeling tired or sluggish  Slower thoughts or cloudy thinking  Being more sensitive to pain over time; pain is harder to control  Trouble sleeping or restless sleep  Changes in hormone levels (for example, less testosterone)  Changes in sex drive or ability to have sex  Constipation  Unsafe driving  Itching and sweating  Dizziness  Nausea, throwing up and dry mouth    What else should I know about opioids?    Opioids may lead to dependence, tolerance, or addiction.    Dependence means that if you stop or reduce the medicine too quickly, you will have withdrawal symptoms. These include loose poop (diarrhea), jitters, flu-like symptoms, nervousness and tremors. Dependence is not the same as addiction.                     Tolerance means needing higher doses over time to  get the same effect. This may increase the chance of serious side effects.    Addiction is when people improperly use a substance that harms their body, their mind or their relations with others. Use of opiates can cause a relapse of addiction if you have a history of drug or alcohol abuse.    People who have used opioids for a long time may have a lower quality of life, worse depression, higher levels of pain and more visits to doctors.    You can overdose on opioids. Take these steps to lower your risk of overdose:    Recognize the signs:  Signs of overdose include decrease or loss of consciousness (blackout), slowed breathing, trouble waking up and blue lips. If someone is worried about overdose, they should call 911.    Talk to your doctor about Narcan (naloxone).   If you are at risk for overdose, you may be given a prescription for Narcan. This medicine very quickly reverses the effects of opioids.   If you overdose, a friend or family member can give you Narcan while waiting for the ambulance. They need to know the signs of overdose and how to give Narcan.     Don't use alcohol or street drugs.   Taking them with opioids can cause death.    Do not take any of these medicines unless your doctor says it s OK. Taking these with opioids can cause death:  Benzodiazepines, such as lorazepam (Ativan), alprazolam (Xanax) or diazepam (Valium)  Muscle relaxers, such as cyclobenzaprine (Flexeril)  Sleeping pills like zolpidem (Ambien)   Other opioids      How to keep you and other people safe while taking opioids:    Never share your opioids with others.  Opioid medicines are regulated by the Drug Enforcement Agency ([place]). Selling or sharing medications is a criminal act.    2. Be sure to store opioids in a secure place, locked up if possible. Young children can easily swallow them and overdose.    3. When you are traveling with your medicines, keep them in the original bottles. If you use a pill box, be sure you also  carry a copy of your medicine list from your clinic or pharmacy.    4. Safe disposal of opioids    Most pharmacies have places to get rid of medicine, called disposal kiosks. Medicine disposal options are also available in every [facility]. Search your county and  medication disposal  to find more options. You can find more details at:  https://www.pca.[facility].[email or web address]./living-green/managing-unwanted-medications     I agree that my provider, clinic care team, and pharmacy may work with any city, state or federal law enforcement agency that investigates the misuse, sale, or other diversion of my controlled medicine. I will allow my provider to discuss my care with, or share a copy of, this agreement with any other treating provider, pharmacy or emergency room where I receive care.    I have read this agreement and have asked questions about anything I did not understand.    _______________________________________________________  Patient Signature - Joe Bearden _____________________                   Date     _______________________________________________________  Provider Signature - Juan Otero MD   _____________________                   Date     _______________________________________________________  Witness Signature (required if provider not present while patient signing)   _____________________                   Date

## 2023-11-29 NOTE — NURSING NOTE
"Medication Reconciliation: Complete    Hue Rubio LPN  11/29/2023 10:47 AM  Chief Complaint   Patient presents with    Diabetes     Check up       Initial /70 (BP Location: Right arm, Patient Position: Sitting, Cuff Size: Adult Large)   Pulse (!) 45   Temp 97  F (36.1  C) (Tympanic)   Resp 16   Ht 1.803 m (5' 11\")   Wt 104.3 kg (230 lb)   SpO2 97%   BMI 32.08 kg/m   Estimated body mass index is 32.08 kg/m  as calculated from the following:    Height as of this encounter: 1.803 m (5' 11\").    Weight as of this encounter: 104.3 kg (230 lb).  Medication Review: complete    The next two questions are to help us understand your food security.  If you are feeling you need any assistance in this area, we have resources available to support you today.          11/29/2023   SDOH- Food Insecurity   Within the past 12 months, did you worry that your food would run out before you got money to buy more? N   Within the past 12 months, did the food you bought just not last and you didn t have money to get more? N         Health Care Directive:  Patient does not have a Health Care Directive or Living Will: Discussed advance care planning with patient; however, patient declined at this time.    Hue Rubio LPN      "

## 2023-11-29 NOTE — LETTER
November 29, 2023      Joe KYLE Monisha  [address]   [place] [address]        Dear ,    We are writing to inform you of your test results.    A1c looks good/at goal. Kidney function a little worse than last check, there is protein in your urine. This is all likely due to diabetes. No changes to meds. Losartan will help protect your kidneys as well as control blood pressure. Your hemoglobin is low, has this been working up for you in the past? If not, please make a follow up visit so we can discuss further     Resulted Orders   Albumin Random Urine Quantitative with Creat Ratio   Result Value Ref Range    Creatinine Urine mg/dL 234.2 mg/dL      Comment:      The reference ranges have not been established in urine creatinine. The results should be integrated into the clinical context for interpretation.    Albumin Urine mg/L 123.8 mg/L      Comment:      The reference ranges have not been established in urine albumin. The results should be integrated into the clinical context for interpretation.    Albumin Urine mg/g Cr 52.86 (H) 0.00 - 17.00 mg/g Cr      Comment:      Microalbuminuria is defined as an albumin:creatinine ratio of 17 to 299 for males and 25 to 299 for females. A ratio of albumin:creatinine of 300 or higher is indicative of overt proteinuria.  Due to biologic variability, positive results should be confirmed by a second, first-morning random or 24-hour timed urine specimen. If there is discrepancy, a third specimen is recommended. When 2 out of 3 results are in the microalbuminuria range, this is evidence for incipient nephropathy and warrants increased efforts at glucose control, blood pressure control, and institution of therapy with an angiotensin-converting-enzyme (ACE) inhibitor (if the patient can tolerate it).     Lipid Panel   Result Value Ref Range    Cholesterol 121 <200 mg/dL    Triglycerides 88 <150 mg/dL    Direct Measure HDL 49 >=40 mg/dL    LDL Cholesterol  Calculated 54 <=100 mg/dL    Non HDL Cholesterol 72 <130 mg/dL    Narrative    Cholesterol  Desirable:  <200 mg/dL    Triglycerides  Normal:  Less than 150 mg/dL  Borderline High:  150-199 mg/dL  High:  200-499 mg/dL  Very High:  Greater than or equal to 500 mg/dL    Direct Measure HDL  Female:  Greater than or equal to 50 mg/dL   Male:  Greater than or equal to 40 mg/dL    LDL Cholesterol  Desirable:  <100mg/dL  Above Desirable:  100-129 mg/dL   Borderline High:  130-159 mg/dL   High:  160-189 mg/dL   Very High:  >= 190 mg/dL    Non HDL Cholesterol  Desirable:  130 mg/dL  Above Desirable:  130-159 mg/dL  Borderline High:  160-189 mg/dL  High:  190-219 mg/dL  Very High:  Greater than or equal to 220 mg/dL   Hemoglobin A1c   Result Value Ref Range    Hemoglobin A1C 6.9 (H) 4.0 - 6.2 %   Comprehensive Metabolic Panel   Result Value Ref Range    Sodium 139 135 - 145 mmol/L      Comment:      Reference intervals for this test were updated on 09/26/2023 to more accurately reflect our healthy population. There may be differences in the flagging of prior results with similar values performed with this method. Interpretation of those prior results can be made in the context of the updated reference intervals.     Potassium 4.5 3.4 - 5.3 mmol/L    Carbon Dioxide (CO2) 26 22 - 29 mmol/L    Anion Gap 9 7 - 15 mmol/L    Urea Nitrogen 19.1 8.0 - 23.0 mg/dL    Creatinine 1.24 (H) 0.67 - 1.17 mg/dL    GFR Estimate 60 (L) >60 mL/min/1.73m2    Calcium 9.1 8.8 - 10.2 mg/dL    Chloride 104 98 - 107 mmol/L    Glucose 161 (H) 70 - 99 mg/dL    Alkaline Phosphatase 93 40 - 150 U/L      Comment:      Reference intervals for this test were updated on 11/14/2023 to more accurately reflect our healthy population. There may be differences in the flagging of prior results with similar values performed with this method. Interpretation of those prior results can be made in the context of the updated reference intervals.    AST 18 0 - 45 U/L       Comment:      Reference intervals for this test were updated on 6/12/2023 to more accurately reflect our healthy population. There may be differences in the flagging of prior results with similar values performed with this method. Interpretation of those prior results can be made in the context of the updated reference intervals.    ALT 19 0 - 70 U/L      Comment:      Reference intervals for this test were updated on 6/12/2023 to more accurately reflect our healthy population. There may be differences in the flagging of prior results with similar values performed with this method. Interpretation of those prior results can be made in the context of the updated reference intervals.      Protein Total 6.7 6.4 - 8.3 g/dL    Albumin 4.3 3.5 - 5.2 g/dL    Bilirubin Total 0.5 <=1.2 mg/dL   CBC with platelets and differential   Result Value Ref Range    WBC Count 7.8 4.0 - 11.0 10e3/uL    RBC Count 4.11 (L) 4.40 - 5.90 10e6/uL    Hemoglobin 12.8 (L) 13.3 - 17.7 g/dL    Hematocrit 36.2 (L) 40.0 - 53.0 %    MCV 88 78 - 100 fL    MCH 31.1 26.5 - 33.0 pg    MCHC 35.4 31.5 - 36.5 g/dL    RDW 12.7 10.0 - 15.0 %    Platelet Count 212 150 - 450 10e3/uL    % Neutrophils 58 %    % Lymphocytes 29 %    % Monocytes 7 %    % Eosinophils 5 %    % Basophils 1 %    % Immature Granulocytes 0 %    NRBCs per 100 WBC 0 <1 /100    Absolute Neutrophils 4.6 1.6 - 8.3 10e3/uL    Absolute Lymphocytes 2.3 0.8 - 5.3 10e3/uL    Absolute Monocytes 0.6 0.0 - 1.3 10e3/uL    Absolute Eosinophils 0.4 0.0 - 0.7 10e3/uL    Absolute Basophils 0.1 0.0 - 0.2 10e3/uL    Absolute Immature Granulocytes 0.0 <=0.4 10e3/uL    Absolute NRBCs 0.0 10e3/uL       If you have any questions or concerns, please call the clinic at the number listed above.       Sincerely,      Juan Otero MD

## 2023-12-26 ENCOUNTER — TELEPHONE (OUTPATIENT)
Dept: FAMILY MEDICINE | Facility: OTHER | Age: 78
End: 2023-12-26
Payer: COMMERCIAL

## 2023-12-26 DIAGNOSIS — Z79.4 TYPE 2 DIABETES MELLITUS WITH OTHER SPECIFIED COMPLICATION, WITH LONG-TERM CURRENT USE OF INSULIN (H): ICD-10-CM

## 2023-12-26 DIAGNOSIS — E11.69 TYPE 2 DIABETES MELLITUS WITH OTHER SPECIFIED COMPLICATION, WITH LONG-TERM CURRENT USE OF INSULIN (H): ICD-10-CM

## 2023-12-26 NOTE — TELEPHONE ENCOUNTER
Form sent by [facility]  Dahianang for provider signature.    Jacqueline Chu LPN on 12/26/2023 at 12:03 PM

## 2023-12-27 DIAGNOSIS — G20.A2 PARKINSON'S DISEASE WITH FLUCTUATING MANIFESTATIONS, UNSPECIFIED WHETHER DYSKINESIA PRESENT (H): Primary | ICD-10-CM

## 2023-12-27 DIAGNOSIS — F22 DELUSIONAL DISORDER (H): ICD-10-CM

## 2023-12-27 DIAGNOSIS — G47.411 PRIMARY NARCOLEPSY WITH CATAPLEXY: ICD-10-CM

## 2023-12-27 NOTE — TELEPHONE ENCOUNTER
Request for refills  Reason for call: Medication or medication refill    Name of medication requested: Roperrmye(unsure of spelling), Nortriptyline, Adderall, Ritalin, Oxycodone    How many days of medication do you have left? Avg a week left    What pharmacy do you use? [facility]    Preferred method for responding to this message: Telephone Call    Phone number patient can be reached at: Home number on file [phone] (home)    If we cannot reach you directly, may we leave a detailed response at the number you provided? Yes     Jaiden Price on 12/27/2023 at 3:49 PM

## 2023-12-27 NOTE — TELEPHONE ENCOUNTER
Percocet ordered 11/29/23, for start date of today and 1/24/24.  Ritalin ordered 11/29/23, for start date of 11/29/23, 12/30/24 and 1/30/24.    Requested Prescriptions   Pending Prescriptions Disp Refills    rOPINIRole (REQUIP) 2 MG tablet       Sig: Take 1 tablet (2 mg) by mouth at bedtime   Historical      nortriptyline (PAMELOR) 10 MG capsule       Sig: Take 1 capsule (10 mg) by mouth at bedtime   Historical      amphetamine-dextroamphetamine (ADDERALL) 20 MG tablet       Sig: Take 1 tablet (20 mg) by mouth       There is no refill protocol information for this order   Historical     Last Office Visit:              11/29/23   Future Office visit:             Next 5 appointments (look out 90 days)      Feb 29, 2024 12:40 PM  SHORT with Juan Otero MD  [facility] ([facility] ) [address]  [place] [address] [phone]          Per LOV note:  Return in about 3 months (around 2/29/2024) for pain med refill .     Unable to complete prescription refill per RN Medication Refill Policy.     Hue Lagunas RN .............. 12/27/2023  4:09 PM

## 2023-12-27 NOTE — TELEPHONE ENCOUNTER
Left message for patient's daughter with information that form has been received, signed and is being faxed back today.    Hue Rubio LPN............12/27/2023 9:08 AM

## 2023-12-28 RX ORDER — NORTRIPTYLINE HCL 10 MG
10 CAPSULE ORAL AT BEDTIME
Qty: 90 CAPSULE | Refills: 0 | Status: SHIPPED | OUTPATIENT
Start: 2023-12-28 | End: 2024-02-28

## 2023-12-28 RX ORDER — DEXTROAMPHETAMINE SACCHARATE, AMPHETAMINE ASPARTATE, DEXTROAMPHETAMINE SULFATE AND AMPHETAMINE SULFATE 5; 5; 5; 5 MG/1; MG/1; MG/1; MG/1
20 TABLET ORAL DAILY
Qty: 30 TABLET | Refills: 0 | Status: SHIPPED | OUTPATIENT
Start: 2023-12-28 | End: 2024-02-29

## 2023-12-28 RX ORDER — ROPINIROLE 2 MG/1
2 TABLET, FILM COATED ORAL AT BEDTIME
Qty: 90 TABLET | Refills: 0 | Status: SHIPPED | OUTPATIENT
Start: 2023-12-28 | End: 2024-05-24

## 2024-01-17 ENCOUNTER — TRANSFERRED RECORDS (OUTPATIENT)
Dept: HEALTH INFORMATION MANAGEMENT | Facility: OTHER | Age: 79
End: 2024-01-17
Payer: COMMERCIAL

## 2024-01-17 LAB — RETINOPATHY: NEGATIVE

## 2024-02-20 DIAGNOSIS — K21.9 GASTROESOPHAGEAL REFLUX DISEASE WITHOUT ESOPHAGITIS: ICD-10-CM

## 2024-02-20 DIAGNOSIS — I10 PRIMARY HYPERTENSION: Primary | ICD-10-CM

## 2024-02-20 NOTE — TELEPHONE ENCOUNTER
DARIANAD sent Rx request for the following:      Requested Prescriptions   Pending Prescriptions Disp Refills    atenolol (TENORMIN) 100 MG tablet       Sig: Take 1 tablet (100 mg) by mouth daily       Beta-Blockers Protocol Failed - 2/20/2024  2:39 PM    pantoprazole (PROTONIX) 20 MG EC tablet       Sig: Take 1 tablet (20 mg) by mouth daily       PPI Protocol Failed - 2/20/2024  2:39 PM    furosemide (LASIX) 40 MG tablet       Sig: Take 1 tablet (40 mg) by mouth every morning       Diuretics (Including Combos) Protocol Failed - 2/20/2024  2:39 PM   Last Prescription Date:     Last Fill Qty/Refills:         , R-    Last Office Visit:              11/29/23   Future Office visit:           2/29/24    Listed as historical.  Miriam Bucio RN on 2/20/2024 at 2:44 PM

## 2024-02-20 NOTE — TELEPHONE ENCOUNTER
Patient enrolled in our Rx Med Sync service to improve adherence. We are requesting a refill authorization in advance to ensure an active prescription is on file.    Hue Lagunas RN .............. 2/20/2024  10:14 AM

## 2024-02-21 RX ORDER — FUROSEMIDE 40 MG
40 TABLET ORAL EVERY MORNING
Qty: 90 TABLET | Refills: 0 | Status: SHIPPED | OUTPATIENT
Start: 2024-02-21 | End: 2024-05-24

## 2024-02-21 RX ORDER — ATENOLOL 100 MG/1
100 TABLET ORAL DAILY
Qty: 90 TABLET | Refills: 0 | Status: SHIPPED | OUTPATIENT
Start: 2024-02-21 | End: 2024-05-24

## 2024-02-21 RX ORDER — PANTOPRAZOLE SODIUM 20 MG/1
20 TABLET, DELAYED RELEASE ORAL DAILY
Qty: 90 TABLET | Refills: 0 | Status: SHIPPED | OUTPATIENT
Start: 2024-02-21 | End: 2024-05-24

## 2024-02-25 DIAGNOSIS — F22 DELUSIONAL DISORDER (H): ICD-10-CM

## 2024-02-28 RX ORDER — NORTRIPTYLINE HCL 10 MG
10 CAPSULE ORAL AT BEDTIME
Qty: 90 CAPSULE | Refills: 0 | Status: SHIPPED | OUTPATIENT
Start: 2024-02-28 | End: 2024-05-24

## 2024-02-28 NOTE — TELEPHONE ENCOUNTER
Refill request for nortriptyline 10 MG capsule from [facility].  Patient being seen here in clinic tomorrow, routing to PCP to address further refills as necessary in office visit.  Beth Conte RN on 2/28/2024 at 1:08 PM

## 2024-02-29 ENCOUNTER — OFFICE VISIT (OUTPATIENT)
Dept: FAMILY MEDICINE | Facility: OTHER | Age: 79
End: 2024-02-29
Attending: STUDENT IN AN ORGANIZED HEALTH CARE EDUCATION/TRAINING PROGRAM
Payer: COMMERCIAL

## 2024-02-29 VITALS
BODY MASS INDEX: 31.22 KG/M2 | OXYGEN SATURATION: 99 % | HEIGHT: 71 IN | WEIGHT: 223 LBS | DIASTOLIC BLOOD PRESSURE: 64 MMHG | HEART RATE: 45 BPM | TEMPERATURE: 97.1 F | SYSTOLIC BLOOD PRESSURE: 130 MMHG | RESPIRATION RATE: 16 BRPM

## 2024-02-29 DIAGNOSIS — E66.01 MORBID (SEVERE) OBESITY DUE TO EXCESS CALORIES (H): ICD-10-CM

## 2024-02-29 DIAGNOSIS — I10 PRIMARY HYPERTENSION: ICD-10-CM

## 2024-02-29 DIAGNOSIS — E11.51 TYPE 2 DIABETES MELLITUS WITH PERIPHERAL VASCULAR DISEASE (H): ICD-10-CM

## 2024-02-29 DIAGNOSIS — F90.2 ATTENTION DEFICIT HYPERACTIVITY DISORDER (ADHD), COMBINED TYPE: Primary | ICD-10-CM

## 2024-02-29 DIAGNOSIS — G47.419 PRIMARY NARCOLEPSY WITHOUT CATAPLEXY: ICD-10-CM

## 2024-02-29 DIAGNOSIS — E03.9 HYPOTHYROIDISM, UNSPECIFIED TYPE: ICD-10-CM

## 2024-02-29 DIAGNOSIS — F11.20 CHRONIC NARCOTIC DEPENDENCE (H): ICD-10-CM

## 2024-02-29 PROBLEM — G21.9 SECONDARY PARKINSONISM, UNSPECIFIED SECONDARY PARKINSONISM TYPE (H): Status: RESOLVED | Noted: 2023-11-29 | Resolved: 2024-02-29

## 2024-02-29 PROBLEM — F22 DELUSIONAL DISORDER (H): Status: RESOLVED | Noted: 2023-11-29 | Resolved: 2024-02-29

## 2024-02-29 LAB
HBA1C MFR BLD: 6.8 % (ref 4–6.2)
TSH SERPL DL<=0.005 MIU/L-ACNC: 0.38 UIU/ML (ref 0.3–4.2)

## 2024-02-29 PROCEDURE — G0463 HOSPITAL OUTPT CLINIC VISIT: HCPCS | Performed by: STUDENT IN AN ORGANIZED HEALTH CARE EDUCATION/TRAINING PROGRAM

## 2024-02-29 PROCEDURE — 83036 HEMOGLOBIN GLYCOSYLATED A1C: CPT | Mod: ZL | Performed by: STUDENT IN AN ORGANIZED HEALTH CARE EDUCATION/TRAINING PROGRAM

## 2024-02-29 PROCEDURE — 99207 PR FOOT EXAM NO CHARGE: CPT | Performed by: STUDENT IN AN ORGANIZED HEALTH CARE EDUCATION/TRAINING PROGRAM

## 2024-02-29 PROCEDURE — 99214 OFFICE O/P EST MOD 30 MIN: CPT | Performed by: STUDENT IN AN ORGANIZED HEALTH CARE EDUCATION/TRAINING PROGRAM

## 2024-02-29 PROCEDURE — 36415 COLL VENOUS BLD VENIPUNCTURE: CPT | Mod: ZL | Performed by: STUDENT IN AN ORGANIZED HEALTH CARE EDUCATION/TRAINING PROGRAM

## 2024-02-29 PROCEDURE — 84443 ASSAY THYROID STIM HORMONE: CPT | Mod: ZL | Performed by: STUDENT IN AN ORGANIZED HEALTH CARE EDUCATION/TRAINING PROGRAM

## 2024-02-29 RX ORDER — DEXTROAMPHETAMINE SACCHARATE, AMPHETAMINE ASPARTATE MONOHYDRATE, DEXTROAMPHETAMINE SULFATE AND AMPHETAMINE SULFATE 5; 5; 5; 5 MG/1; MG/1; MG/1; MG/1
20 CAPSULE, EXTENDED RELEASE ORAL 2 TIMES DAILY
Qty: 60 CAPSULE | Refills: 0 | Status: SHIPPED | OUTPATIENT
Start: 2024-05-01 | End: 2024-05-31

## 2024-02-29 RX ORDER — AMLODIPINE BESYLATE 10 MG/1
10 TABLET ORAL DAILY
Qty: 90 TABLET | Refills: 3 | Status: SHIPPED | OUTPATIENT
Start: 2024-02-29

## 2024-02-29 RX ORDER — OXYCODONE AND ACETAMINOPHEN 5; 325 MG/1; MG/1
1 TABLET ORAL EVERY 12 HOURS PRN
Qty: 60 TABLET | Refills: 0 | Status: SHIPPED | OUTPATIENT
Start: 2024-04-01 | End: 2024-05-01

## 2024-02-29 RX ORDER — METHYLPHENIDATE HYDROCHLORIDE 20 MG/1
20 TABLET ORAL 2 TIMES DAILY
Qty: 60 TABLET | Refills: 0 | Status: SHIPPED | OUTPATIENT
Start: 2024-03-31 | End: 2024-04-30

## 2024-02-29 RX ORDER — OXYCODONE AND ACETAMINOPHEN 5; 325 MG/1; MG/1
1 TABLET ORAL EVERY 12 HOURS PRN
Qty: 60 TABLET | Refills: 0 | Status: SHIPPED | OUTPATIENT
Start: 2024-05-01 | End: 2024-05-31

## 2024-02-29 RX ORDER — METHYLPHENIDATE HYDROCHLORIDE 20 MG/1
20 TABLET ORAL 2 TIMES DAILY
Qty: 60 TABLET | Refills: 0 | Status: SHIPPED | OUTPATIENT
Start: 2024-02-29 | End: 2024-03-30

## 2024-02-29 RX ORDER — DEXTROAMPHETAMINE SACCHARATE, AMPHETAMINE ASPARTATE MONOHYDRATE, DEXTROAMPHETAMINE SULFATE AND AMPHETAMINE SULFATE 5; 5; 5; 5 MG/1; MG/1; MG/1; MG/1
20 CAPSULE, EXTENDED RELEASE ORAL 2 TIMES DAILY
Qty: 60 CAPSULE | Refills: 0 | Status: SHIPPED | OUTPATIENT
Start: 2024-02-29 | End: 2024-03-30

## 2024-02-29 RX ORDER — DEXTROAMPHETAMINE SACCHARATE, AMPHETAMINE ASPARTATE MONOHYDRATE, DEXTROAMPHETAMINE SULFATE AND AMPHETAMINE SULFATE 5; 5; 5; 5 MG/1; MG/1; MG/1; MG/1
20 CAPSULE, EXTENDED RELEASE ORAL 2 TIMES DAILY
Qty: 60 CAPSULE | Refills: 0 | Status: SHIPPED | OUTPATIENT
Start: 2024-03-31 | End: 2024-04-30

## 2024-02-29 RX ORDER — OXYCODONE AND ACETAMINOPHEN 5; 325 MG/1; MG/1
1 TABLET ORAL EVERY 12 HOURS PRN
Qty: 60 TABLET | Refills: 0 | Status: SHIPPED | OUTPATIENT
Start: 2024-03-01 | End: 2024-06-11

## 2024-02-29 RX ORDER — METHYLPHENIDATE HYDROCHLORIDE 20 MG/1
20 TABLET ORAL 2 TIMES DAILY
Qty: 60 TABLET | Refills: 0 | Status: SHIPPED | OUTPATIENT
Start: 2024-05-01 | End: 2024-05-31

## 2024-02-29 NOTE — NURSING NOTE
"Patient presents to clinic with his daughter Margot.  Chief Complaint   Patient presents with    Diabetes     Check up, left foot tingling       Initial /64 (BP Location: Right arm, Patient Position: Sitting, Cuff Size: Adult Large)   Pulse (!) 45   Temp 97.1  F (36.2  C) (Tympanic)   Resp 16   Ht 1.803 m (5' 11\")   Wt 101.2 kg (223 lb)   SpO2 99%   BMI 31.10 kg/m   Estimated body mass index is 31.1 kg/m  as calculated from the following:    Height as of this encounter: 1.803 m (5' 11\").    Weight as of this encounter: 101.2 kg (223 lb).  Medication Review: complete    The next two questions are to help us understand your food security.  If you are feeling you need any assistance in this area, we have resources available to support you today.          2/29/2024   SDOH- Food Insecurity   Within the past 12 months, did you worry that your food would run out before you got money to buy more? N   Within the past 12 months, did the food you bought just not last and you didn t have money to get more? N         Health Care Directive:  Patient does not have a Health Care Directive or Living Will: Discussed advance care planning with patient; however, patient declined at this time.    Hue Rubio LPN      Medication Reconciliation: Complete    Hue Rubio LPN  2/29/2024 12:51 PM  "

## 2024-02-29 NOTE — PROGRESS NOTES
Assessment & Plan   Chronic narcotic dependence (H)  Chronic, continue current dose. Pain fairly controlled.  reviewed and appropriate   - oxyCODONE-acetaminophen (PERCOCET) 5-325 MG tablet; Take 1 tablet by mouth every 12 hours as needed for pain  - oxyCODONE-acetaminophen (PERCOCET) 5-325 MG tablet; Take 1 tablet by mouth every 12 hours as needed for pain  - oxyCODONE-acetaminophen (PERCOCET) 5-325 MG tablet; Take 1 tablet by mouth every 12 hours as needed for pain    Morbid (severe) obesity due to excess calories (H)  Chronic, complicates HTN and diabetes     Type 2 diabetes mellitus with peripheral vascular disease (H)  Well controlled. Referral to RN for foot cares   - Hemoglobin A1c; Future  - FOOT EXAM  - Orthopedic  Referral; Future  - Hemoglobin A1c    Attention deficit hyperactivity disorder (ADHD), combined type  Primary narcolepsy without cataplexy  Chronic stable,  appropriate. Meds started per previous PCP   - amphetamine-dextroamphetamine (ADDERALL XR) 20 MG 24 hr capsule; Take 1 capsule (20 mg) by mouth 2 times daily for 30 days  - amphetamine-dextroamphetamine (ADDERALL XR) 20 MG 24 hr capsule; Take 1 capsule (20 mg) by mouth 2 times daily for 30 days  - amphetamine-dextroamphetamine (ADDERALL XR) 20 MG 24 hr capsule; Take 1 capsule (20 mg) by mouth 2 times daily for 30 days  - methylphenidate (RITALIN) 20 MG tablet; Take 1 tablet (20 mg) by mouth 2 times daily for 30 days  - methylphenidate (RITALIN) 20 MG tablet; Take 1 tablet (20 mg) by mouth 2 times daily for 30 days  - methylphenidate (RITALIN) 20 MG tablet; Take 1 tablet (20 mg) by mouth 2 times daily for 30 days    Hypothyroidism, unspecified type  Normal labs today. Continue current dose of synthroid   - TSH Reflex GH; Future  - TSH Reflex GH    Primary hypertension  Needs refill. His cardiologist had been adjusting   - amLODIPine (NORVASC) 10 MG tablet; Take 1 tablet (10 mg) by mouth daily            BMI  Estimated body  "mass index is 31.1 kg/m  as calculated from the following:    Height as of this encounter: 1.803 m (5' 11\").    Weight as of this encounter: 101.2 kg (223 lb).             No follow-ups on file.    Monica Diaz is a 78 year old, presenting for the following health issues:  Diabetes (Check up, left foot tingling)    History of Present Illness       Hypertension: He presents for follow up of hypertension.  He does not check blood pressure  regularly outside of the clinic. Outside blood pressures have been over 140/90. He does not follow a low salt diet.     He eats 0-1 servings of fruits and vegetables daily.He consumes 3 sweetened beverage(s) daily.He exercises with enough effort to increase his heart rate 20 to 29 minutes per day.  He exercises with enough effort to increase his heart rate 7 days per week. He is missing 2 dose(s) of medications per week.       Here for med refills: adderall and ritalin for narcolepsy and adhd, percocet for chronic pain   - established with me this past year.   - meds have been chronic per previous PCP   - due for diabetes check and TSH  - has calloused feet, no sores               Review of Systems  Constitutional, HEENT, cardiovascular, pulmonary, gi and gu systems are negative, except as otherwise noted.      Objective    /64 (BP Location: Right arm, Patient Position: Sitting, Cuff Size: Adult Large)   Pulse (!) 45   Temp 97.1  F (36.2  C) (Tympanic)   Resp 16   Ht 1.803 m (5' 11\")   Wt 101.2 kg (223 lb)   SpO2 99%   BMI 31.10 kg/m    Body mass index is 31.1 kg/m .  Physical Exam   GENERAL: alert and no distress  RESP: lungs clear to auscultation - no rales, rhonchi or wheezes  CV: regular rate and rhythm, normal S1 S2, no S3 or S4, no murmur, click or rub, no peripheral edema   NEURO: Normal strength and tone, mentation intact and speech normal  PSYCH: mentation appears normal, affect normal/bright  Diabetic foot exam: normal DP and PT pulses, normal sensory " exam, and thickened toenails and hyperkeratotic skin. No sores.     Results for orders placed or performed in visit on 02/29/24 (from the past 24 hour(s))   Hemoglobin A1c   Result Value Ref Range    Hemoglobin A1C 6.8 (H) 4.0 - 6.2 %   TSH Reflex GH   Result Value Ref Range    TSH 0.38 0.30 - 4.20 uIU/mL           Signed Electronically by: Juan Otero MD

## 2024-02-29 NOTE — RESULT ENCOUNTER NOTE
Please call with results: A1c looks good at 6.8. this means your diabetes is well controlled. We can check this every 6 months

## 2024-03-18 DIAGNOSIS — E53.8 VITAMIN B12 DEFICIENCY (NON ANEMIC): Primary | ICD-10-CM

## 2024-03-18 DIAGNOSIS — I10 PRIMARY HYPERTENSION: ICD-10-CM

## 2024-03-18 DIAGNOSIS — K59.1 FUNCTIONAL DIARRHEA: ICD-10-CM

## 2024-03-18 RX ORDER — LANOLIN ALCOHOL/MO/W.PET/CERES
1000 CREAM (GRAM) TOPICAL DAILY
Qty: 90 TABLET | Refills: 0 | Status: SHIPPED | OUTPATIENT
Start: 2024-03-18 | End: 2024-06-11

## 2024-03-18 RX ORDER — LOPERAMIDE HCL 2 MG
CAPSULE ORAL
Qty: 270 CAPSULE | Refills: 0 | Status: SHIPPED | OUTPATIENT
Start: 2024-03-18 | End: 2024-07-16

## 2024-03-18 RX ORDER — LOSARTAN POTASSIUM 100 MG/1
100 TABLET ORAL DAILY
Qty: 90 TABLET | Refills: 3 | Status: CANCELLED | OUTPATIENT
Start: 2024-03-18

## 2024-03-18 NOTE — TELEPHONE ENCOUNTER
losartan (COZAAR) 100 MG tablet 90 tablet 3 11/29/2023 -- No   Sig - Route: Take 1 tablet (100 mg) by mouth daily - Oral   Sent to pharmacy as: Losartan Potassium 100 MG Oral Tablet (COZAAR)   Class: E-Prescribe   Order: [id number]   E-Prescribing Status: Receipt confirmed by pharmacy (11/29/2023 10:56 AM CST)     [id number] - [place] [address]    TOO SOON- please notify Pt.      cyanocobalamin (VITAMIN B-12) 1000 MCG tablet       Sig: Take 1 tablet (1,000 mcg) by mouth daily   Historical    Vitamin Supplements Protocol Failed - 3/18/2024  4:28 PM        Failed - Medication indicated for associated diagnosis     The medication is prescribed for one or more of the following conditions:     Vitamin deficiency       loperamide (IMODIUM) 2 MG capsule       Sig: TAKE 2 CAPSULES IN THE MORNING AND 1 CAPSULE IN THE EVENING   Historical    There is no refill protocol information for this order        Last Office Visit:              2/29/24   Future Office visit:           8/5/24    Unable to complete prescription refill per RN Medication Refill Policy.     Routing to covering provider for refill consideration, as PCP/provider is out of clinic >48 hours or Pt is completely out of medication and provider is out of the clinic today.      Hue Lagunas RN .............. 3/18/2024  4:31 PM

## 2024-03-18 NOTE — TELEPHONE ENCOUNTER
Reason for call: Medication or medication refill    Name of medication requested: Losartan 100 mg, vitamin B12, loperamide 2 mg (2 a.m./1 p.m.).    How many days of medication do you have left? 2 days    What pharmacy do you use? [facility]    Preferred method for responding to this message: Telephone Call    Phone number patient can be reached at: Other phone number:  [phone]    If we cannot reach you directly, may we leave a detailed response at the number you provided? Yes    Yomaira Jones on 3/18/2024 at 3:55 PM

## 2024-04-11 ENCOUNTER — OFFICE VISIT (OUTPATIENT)
Dept: INTERNAL MEDICINE | Facility: OTHER | Age: 79
End: 2024-04-11
Attending: STUDENT IN AN ORGANIZED HEALTH CARE EDUCATION/TRAINING PROGRAM

## 2024-04-11 VITALS
WEIGHT: 222.4 LBS | SYSTOLIC BLOOD PRESSURE: 124 MMHG | RESPIRATION RATE: 18 BRPM | TEMPERATURE: 96.5 F | DIASTOLIC BLOOD PRESSURE: 80 MMHG | HEART RATE: 54 BPM | BODY MASS INDEX: 31.02 KG/M2 | OXYGEN SATURATION: 96 %

## 2024-04-11 DIAGNOSIS — E66.01 MORBID (SEVERE) OBESITY DUE TO EXCESS CALORIES (H): Primary | ICD-10-CM

## 2024-04-11 DIAGNOSIS — E11.51 TYPE 2 DIABETES MELLITUS WITH PERIPHERAL VASCULAR DISEASE (H): ICD-10-CM

## 2024-04-11 PROCEDURE — 99207 PR NO CHARGE NURSE ONLY: CPT

## 2024-04-11 ASSESSMENT — PAIN SCALES - GENERAL: PAINLEVEL: SEVERE PAIN (6)

## 2024-04-11 NOTE — PROGRESS NOTES
Joe Bearden  : 1945 Age: 78 year old Sex: male MRN: [id number]      Encounter Date:  2024    Patient Name:  Joe Bearden  Patient MRN:   [id number]     Last Footcare Appt: Initial   PCP: Juan Nava    Other Providers Seen for Foot/Nail Care (name/location/date): [] Yes [x] No List (if applicable):      GENERAL:    Pertinent Past Medical History:    DM      Yes   Blood Thinners   Yes     Infectious Disease    No     Mobility     [x] Walks Unassisted [] Cane          [] Walker      [] Wheelchair   [] Crutches   Shoe Gear Today   [x] Appropriate [] Needs Change   [] Shows signs of pressure    FOOT EXAMINATION:     Texture:    [] Thin [x] Thick     [] Fragile   Color:     [x] Normal [] Rubor      [] Pallor      [] Brown   [] Hemosiderin     [] Erythema    RIGHT  LEFT    Foot Pulses:        Dorsalis pedis   [x]  [x]  Unable to palpate due to edema    Posterior tibial   [x]  [x]  Unable to palpate due to edema  Capillary refill <3 sec:   [x]  [x]  Hair growth on legs:   [x]  [x]  DESCRIBE: [x] Present [] Absent  Edema (pitting vs. non pitting): [x]  [x]      DESCRIBE: [] 1+ minimal [x] 2+ moderate pitting [] 3+ severe  Skin Temperature:    [x]  [x]      DESCRIBE: [] Cold [] Hot [x] Normal   Calluses/Corns:   []  []  DESCRIBE:   Fissures/Cracks:   []  []  DESCRIBE:   Toenail Appearance:   [x]  [x]  DESCRIBE: [x] Thick [x] Elongated [x] Dystrophic [x] Discolored [] Normal  Previous foot ulcer/injury:  []  []  DESCRIBE:   Amputation:    []  []  DESCRIBE:  Monofilament Testing:          Intact    []  []      Diminished   [x]  [x]     Ingrown Toenail:   []  []  Deformity present:   []  []  DESCRIBE: [] Bunion [] Claw toe [] Hammer toe [] Mallet toe  [] Other   Other:     []  []  DESCRIBE: [] Plantar warts [] Maceration  Patient reports:    Unable to trim his own toenails    Nursing Intervention:   [x] Reduced elongated and/or dystrophic nails   [] Reduced corn/callus  Instruments Used:   [x]  Basic Clippers [] Rotary Tool [x] Manual File [] Curette [] Paring Device  Iatrogenic scratch/cut lesion:    Yes   Location:     Left foot third toenail   Hemostasis achieved by:  [x] Pressure with sterile gauze [x] Silver Nitrate [x] Band-aid       NURSING CARE PLAN:    [x] Patient is unable to trim own toenails  [x] Patient has limited sensation in both feet due to DM  [x] Patient is on chronic anticoagulation and an increased risk of bleeding should patient sustain a nail/skin injury.  [x] Patient is requesting routine foot care    Educated Patient On:    [x] Educated on importance of diet and exercise   [x] Importance of daily skin assessments and use of lotion (gold bond) to feet daily but not between the toes.    [x] Importance using vicks to toenails daily before bed   [x] Importance of good, supportive shoes - patient states he does not have diabetic shoes            YES NO   Diabetic socks:      [] [x] [x] Not applicable   Compression stockings/sleeves:   [x] [] [] Not applicable   Diabetic/supportive shoegear with wide toe box:  [] [x] [] Not applicable      YES NO   Referral to Podiatry:  [] [x] [] Not applicable      Return to clinic: [] One Week  [] Two Weeks  [] 30 Days  [x] 60 Days  [] 90 Days      Assessment and plan discussed with the patient, who voiced understanding and agreement. All questions were answered.       JALEN Nunez, MSN, RN  [facility]    04/11/2024 11:11 AM

## 2024-05-20 DIAGNOSIS — F22 DELUSIONAL DISORDER (H): ICD-10-CM

## 2024-05-20 DIAGNOSIS — I10 PRIMARY HYPERTENSION: ICD-10-CM

## 2024-05-20 DIAGNOSIS — G20.A2 PARKINSON'S DISEASE WITH FLUCTUATING MANIFESTATIONS, UNSPECIFIED WHETHER DYSKINESIA PRESENT (H): ICD-10-CM

## 2024-05-21 DIAGNOSIS — K21.9 GASTROESOPHAGEAL REFLUX DISEASE WITHOUT ESOPHAGITIS: ICD-10-CM

## 2024-05-24 RX ORDER — ATENOLOL 100 MG/1
100 TABLET ORAL DAILY
Qty: 90 TABLET | Refills: 2 | Status: SHIPPED | OUTPATIENT
Start: 2024-05-24 | End: 2024-07-22 | Stop reason: ALTCHOICE

## 2024-05-24 RX ORDER — FUROSEMIDE 40 MG
40 TABLET ORAL EVERY MORNING
Qty: 90 TABLET | Refills: 2 | Status: SHIPPED | OUTPATIENT
Start: 2024-05-24

## 2024-05-24 RX ORDER — ROPINIROLE 2 MG/1
2 TABLET, FILM COATED ORAL AT BEDTIME
Qty: 90 TABLET | Refills: 2 | Status: SHIPPED | OUTPATIENT
Start: 2024-05-24

## 2024-05-24 RX ORDER — NORTRIPTYLINE HCL 10 MG
10 CAPSULE ORAL AT BEDTIME
Qty: 90 CAPSULE | Refills: 2 | Status: SHIPPED | OUTPATIENT
Start: 2024-05-24

## 2024-05-24 RX ORDER — PANTOPRAZOLE SODIUM 20 MG/1
20 TABLET, DELAYED RELEASE ORAL DAILY
Qty: 90 TABLET | Refills: 0 | Status: SHIPPED | OUTPATIENT
Start: 2024-05-24 | End: 2024-08-22

## 2024-05-24 NOTE — TELEPHONE ENCOUNTER
[facility] sent Rx request for the following:      Requested Prescriptions   Pending Prescriptions Disp Refills    pantoprazole (PROTONIX) 20 MG EC tablet [Pharmacy Med Name: PANTOPRAZOLE 20MG DR TABLET] 90 tablet 0     Sig: TAKE 1 TABLET (20 MG) BY MOUTH DAILY       PPI Protocol Passed - 5/24/2024  3:39 PM        Passed - Medication is active on med list        Passed - Medication indicated for associated diagnosis     The medication is prescribed for one or more of the following conditions:     Erosive esophagitis    Gastritis   Gastric hypersecretion   Gastric ulcer   Gastroesophageal reflux disease   Helicobacter pylori gastrointestinal tract infection   Ulcer of duodenum   Drug-induced peptic ulcer   Esophageal stricture   Gastrointestinal hemorrhage   Indigestion   Stress ulcer   Zollinger-Walls syndrome   Rodriguez s esophagus   Laryngopharyngeal reflux          Passed - Recent (12 mo) or future (90 days) visit within the authorizing provider's specialty     The patient must have completed an in-person or virtual visit within the past 12 months or has a future visit scheduled within the next 90 days with the authorizing provider s specialty.  Urgent care and e-visits do not quality as an office visit for this protocol.          Passed - Patient is age 18 or older             Last Prescription Date:   2/21/2023  Last Fill Qty/Refills:         90, R-0    Last Office Visit:              2/29/2024   Future Office visit:           8/05/2024  Next 5 appointments (look out 90 days)      Aug 05, 2024  2:40 PM  (Arrive by 2:25 PM)  Provider Visit with Juan Otero MD  [facility] ([facility] ) [address]  [place] [address]  [phone]     Prescription approved per [facility] Refill Protocol.    Goldie Nascimento RN on 5/24/2024 at 3:43 PM

## 2024-05-24 NOTE — TELEPHONE ENCOUNTER
"  Last Office Visit:              2/29/24 GO   Future Office visit:           8/5/24 KWA    Requested Prescriptions   Pending Prescriptions Disp Refills    rOPINIRole (REQUIP) 2 MG tablet [Pharmacy Med Name: ROPINIROLE 2MG TABLET] 90 tablet 0     Sig: TAKE 1 TABLET (2 MG) BY MOUTH AT BEDTIME       Antiparkinson's Agents Protocol Passed - 5/20/2024  1:23 AM        Passed - Blood pressure under 140/90 in past 12 months     BP Readings from Last 3 Encounters:   04/11/24 124/80   02/29/24 130/64   11/29/23 128/70       No data recorded            Passed - CBC on record in past 12 months     Recent Labs   Lab Test 11/29/23  1116   WBC 7.8   RBC 4.11*   HGB 12.8*   HCT 36.2*                    Passed - ALT on record in past 12 months         Recent Labs   Lab Test 11/29/23  1116   ALT 19             Passed - Serum Creatinine on file in past 12 months     Recent Labs   Lab Test 11/29/23  1116   CR 1.24*                 Passed - Medication is active on med list        Passed - Patient is age 18 or older        Passed - Recent (6 mo) or future (30 days) visit within the authorizing provider's specialty     Patient had office visit in the last 6 months or has a visit in the next 30 days with authorizing provider or within the authorizing provider's specialty.  See \"Patient Info\" tab in inbasket, or \"Choose Columns\" in Meds & Orders section of the refill encounter.       Last Prescription Date:   12/28/23  Last Fill Qty/Refills:         90  /  0                 atenolol (TENORMIN) 100 MG tablet [Pharmacy Med Name: ATENOLOL 100MG TABLET] 90 tablet 0     Sig: TAKE 1 TABLET (100 MG) BY MOUTH DAILY       Beta-Blockers Protocol Passed - 5/20/2024  1:23 AM        Passed - Blood pressure under 140/90 in past 12 months     BP Readings from Last 3 Encounters:   04/11/24 124/80   02/29/24 130/64   11/29/23 128/70       No data recorded            Passed - Patient is age 6 or older        Passed - Medication is active on med list "        Passed - Medication indicated for associated diagnosis     Medication is associated with one or more of the following diagnoses:     Hypertension (HTN)   Atrial fibrillation/flutter   Angina   ASCVD   Migraine   Heart Failure   Tremor   Anxiety   Ocular hypertension   Glaucoma          Passed - Recent (12 mo) or future (90 days) visit within the authorizing provider's specialty     The patient must have completed an in-person or virtual visit within the past 12 months or has a future visit scheduled within the next 90 days with the authorizing provider s specialty.  Urgent care and e-visits do not quality as an office visit for this protocol.     Last Prescription Date:   2/21/24  Last Fill Qty/Refills:         90  /  0                 furosemide (LASIX) 40 MG tablet [Pharmacy Med Name: FUROSEMIDE 40MG TABLET] 90 tablet 0     Sig: TAKE 1 TABLET (40 MG) BY MOUTH EVERY MORNING       Diuretics (Including Combos) Protocol Failed - 5/20/2024  1:23 AM        Failed - Has GFR on file in past 12 months and most recent value is normal        Passed - Blood pressure under 140/90 in past 12 months     BP Readings from Last 3 Encounters:   04/11/24 124/80   02/29/24 130/64   11/29/23 128/70       No data recorded            Passed - Medication is active on med list        Passed - Medication indicated for associated diagnosis     Medication is associated with one or more of the following diagnoses:     Edema   Hypertension   Hypercalcemia   Heart Failure   Chronic Kidney Disease (CKD)          Passed - Recent (12 mo) or future (90 days) visit within the authorizing provider's specialty     The patient must have completed an in-person or virtual visit within the past 12 months or has a future visit scheduled within the next 90 days with the authorizing provider s specialty.  Urgent care and e-visits do not quality as an office visit for this protocol.          Passed - Patient is age 18 or older   Last Prescription Date:    2/21/24  Last Fill Qty/Refills:         90  /  0                 nortriptyline (PAMELOR) 10 MG capsule [Pharmacy Med Name: NORTRIPTYLINE 10MG CAPSULE] 90 capsule 0     Sig: TAKE 1 CAPSULE (10 MG) BY MOUTH AT BEDTIME       Tricyclic Agents ( Annual appt and no PHQ9) Passed - 5/20/2024  1:23 AM        Passed - Blood Pressure under 140/90 in past 12 mos     BP Readings from Last 3 Encounters:   04/11/24 124/80   02/29/24 130/64   11/29/23 128/70             Passed - Recent (12 mo) or future (30 days) visit within authorizing provider's specialty     The patient must have completed an in-person or virtual visit within the past 12 months or has a future visit scheduled within the next 90 days with the authorizing provider s specialty.  Urgent care and e-visits do not quality as an office visit for this protocol.          Passed - Medication is active on med list        Passed - Patient is age 18 or older   Last Prescription Date:   2/28/24  Last Fill Qty/Refills:         90  /  0

## 2024-06-10 ENCOUNTER — TELEPHONE (OUTPATIENT)
Dept: FAMILY MEDICINE | Facility: OTHER | Age: 79
End: 2024-06-10
Payer: COMMERCIAL

## 2024-06-10 NOTE — TELEPHONE ENCOUNTER
Requesting permission to use provider add w/ [facility] or [facility] this week for medication renewal until 8/5 appt w/ Sania?  Vanna Mcintosh on 6/10/2024 at 12:59 PM

## 2024-06-10 NOTE — TELEPHONE ENCOUNTER
Caller reporting the following red-flag symptom(s): chest pain, taking nitroglycerin     Per the system red-flag symptom policy, patient was instructed to:  speak with a Registered Nurse    Action:  Patient warm transferred to a Registered Nurse

## 2024-06-10 NOTE — TELEPHONE ENCOUNTER
Daughter Sadaf called to say her dads prescriptions; ritalin, adderall, and percocet were denied because he didn't make a follow up appointment because he wasn't told he needed to. Daughter is requesting an appointment to get these medications filled. Daughter has more concerns that her dad chest pain is happening more often than usual as he has had to use nitroglycerin tablets on 4 different occasions in a month. Daughter is certain that chest pain has been relived with nitroglycerin each time. Daughter is requesting a blood draw and an EKG for her dad. Daughter states she is certain her dad is taking all medications correctly at home and denies that her dad is in any pain today. Writer will ask a covering provider to see this patient as PCP is on leave.     Ruby Baumann RN on 6/10/2024 at 12:26 PM

## 2024-06-10 NOTE — TELEPHONE ENCOUNTER
Scheduling is to call patent and make an appointment.     Ruby Baumann RN on 6/10/2024 at 12:44 PM

## 2024-06-11 ENCOUNTER — NURSE TRIAGE (OUTPATIENT)
Dept: FAMILY MEDICINE | Facility: OTHER | Age: 79
End: 2024-06-11
Payer: COMMERCIAL

## 2024-06-11 ENCOUNTER — OFFICE VISIT (OUTPATIENT)
Dept: FAMILY MEDICINE | Facility: OTHER | Age: 79
End: 2024-06-11
Attending: FAMILY MEDICINE
Payer: MEDICARE

## 2024-06-11 VITALS
OXYGEN SATURATION: 96 % | BODY MASS INDEX: 30.64 KG/M2 | RESPIRATION RATE: 18 BRPM | TEMPERATURE: 97 F | HEIGHT: 70 IN | WEIGHT: 214 LBS | SYSTOLIC BLOOD PRESSURE: 160 MMHG | HEART RATE: 69 BPM | DIASTOLIC BLOOD PRESSURE: 72 MMHG

## 2024-06-11 DIAGNOSIS — I20.0 UNSTABLE ANGINA (H): Primary | ICD-10-CM

## 2024-06-11 DIAGNOSIS — F11.20 CONTINUOUS OPIOID DEPENDENCE (H): ICD-10-CM

## 2024-06-11 DIAGNOSIS — Z87.891 PERSONAL HISTORY OF TOBACCO USE: ICD-10-CM

## 2024-06-11 DIAGNOSIS — G47.419 PRIMARY NARCOLEPSY WITHOUT CATAPLEXY: ICD-10-CM

## 2024-06-11 DIAGNOSIS — E11.69 TYPE 2 DIABETES MELLITUS WITH OTHER SPECIFIED COMPLICATION, WITH LONG-TERM CURRENT USE OF INSULIN (H): ICD-10-CM

## 2024-06-11 DIAGNOSIS — I47.10 SUPRAVENTRICULAR TACHYCARDIA (H): ICD-10-CM

## 2024-06-11 DIAGNOSIS — Z79.4 TYPE 2 DIABETES MELLITUS WITH OTHER SPECIFIED COMPLICATION, WITH LONG-TERM CURRENT USE OF INSULIN (H): ICD-10-CM

## 2024-06-11 DIAGNOSIS — I10 PRIMARY HYPERTENSION: ICD-10-CM

## 2024-06-11 DIAGNOSIS — F11.20 CHRONIC NARCOTIC DEPENDENCE (H): ICD-10-CM

## 2024-06-11 DIAGNOSIS — E53.8 VITAMIN B12 DEFICIENCY (NON ANEMIC): ICD-10-CM

## 2024-06-11 LAB
ALBUMIN SERPL BCG-MCNC: 4.2 G/DL (ref 3.5–5.2)
ALP SERPL-CCNC: 105 U/L (ref 40–150)
ALT SERPL W P-5'-P-CCNC: 18 U/L (ref 0–70)
ANION GAP SERPL CALCULATED.3IONS-SCNC: 8 MMOL/L (ref 7–15)
AST SERPL W P-5'-P-CCNC: 19 U/L (ref 0–45)
BASOPHILS # BLD AUTO: 0 10E3/UL (ref 0–0.2)
BASOPHILS NFR BLD AUTO: 1 %
BILIRUB SERPL-MCNC: 0.6 MG/DL
BUN SERPL-MCNC: 18.2 MG/DL (ref 8–23)
CALCIUM SERPL-MCNC: 9 MG/DL (ref 8.8–10.2)
CHLORIDE SERPL-SCNC: 103 MMOL/L (ref 98–107)
CREAT SERPL-MCNC: 1.2 MG/DL (ref 0.67–1.17)
CREAT UR-MCNC: 43 MG/DL
DEPRECATED HCO3 PLAS-SCNC: 30 MMOL/L (ref 22–29)
EGFRCR SERPLBLD CKD-EPI 2021: 62 ML/MIN/1.73M2
EOSINOPHIL # BLD AUTO: 0.3 10E3/UL (ref 0–0.7)
EOSINOPHIL NFR BLD AUTO: 6 %
ERYTHROCYTE [DISTWIDTH] IN BLOOD BY AUTOMATED COUNT: 13.2 % (ref 10–15)
GLUCOSE SERPL-MCNC: 133 MG/DL (ref 70–99)
HBA1C MFR BLD: 6.6 % (ref 4–6.2)
HCT VFR BLD AUTO: 38.6 % (ref 40–53)
HGB BLD-MCNC: 12.8 G/DL (ref 13.3–17.7)
IMM GRANULOCYTES # BLD: 0 10E3/UL
IMM GRANULOCYTES NFR BLD: 0 %
LYMPHOCYTES # BLD AUTO: 1.6 10E3/UL (ref 0.8–5.3)
LYMPHOCYTES NFR BLD AUTO: 32 %
MCH RBC QN AUTO: 30.6 PG (ref 26.5–33)
MCHC RBC AUTO-ENTMCNC: 33.2 G/DL (ref 31.5–36.5)
MCV RBC AUTO: 92 FL (ref 78–100)
MONOCYTES # BLD AUTO: 0.4 10E3/UL (ref 0–1.3)
MONOCYTES NFR BLD AUTO: 9 %
NEUTROPHILS # BLD AUTO: 2.7 10E3/UL (ref 1.6–8.3)
NEUTROPHILS NFR BLD AUTO: 53 %
NRBC # BLD AUTO: 0 10E3/UL
NRBC BLD AUTO-RTO: 0 /100
PLATELET # BLD AUTO: 215 10E3/UL (ref 150–450)
POTASSIUM SERPL-SCNC: 4.1 MMOL/L (ref 3.4–5.3)
PROT SERPL-MCNC: 7.5 G/DL (ref 6.4–8.3)
RBC # BLD AUTO: 4.18 10E6/UL (ref 4.4–5.9)
SODIUM SERPL-SCNC: 141 MMOL/L (ref 135–145)
TROPONIN T SERPL HS-MCNC: 13 NG/L
WBC # BLD AUTO: 5.2 10E3/UL (ref 4–11)

## 2024-06-11 PROCEDURE — G0463 HOSPITAL OUTPT CLINIC VISIT: HCPCS | Mod: 25

## 2024-06-11 PROCEDURE — 85049 AUTOMATED PLATELET COUNT: CPT | Mod: ZL | Performed by: FAMILY MEDICINE

## 2024-06-11 PROCEDURE — 82088 ASSAY OF ALDOSTERONE: CPT | Mod: ZL | Performed by: FAMILY MEDICINE

## 2024-06-11 PROCEDURE — 99215 OFFICE O/P EST HI 40 MIN: CPT | Performed by: FAMILY MEDICINE

## 2024-06-11 PROCEDURE — 80360 METHYLPHENIDATE: CPT | Mod: ZL | Performed by: FAMILY MEDICINE

## 2024-06-11 PROCEDURE — G2211 COMPLEX E/M VISIT ADD ON: HCPCS | Performed by: FAMILY MEDICINE

## 2024-06-11 PROCEDURE — 84244 ASSAY OF RENIN: CPT | Mod: ZL | Performed by: FAMILY MEDICINE

## 2024-06-11 PROCEDURE — 83036 HEMOGLOBIN GLYCOSYLATED A1C: CPT | Mod: ZL | Performed by: FAMILY MEDICINE

## 2024-06-11 PROCEDURE — 36415 COLL VENOUS BLD VENIPUNCTURE: CPT | Mod: ZL | Performed by: FAMILY MEDICINE

## 2024-06-11 PROCEDURE — 80356 HEROIN METABOLITE: CPT | Mod: ZL | Performed by: FAMILY MEDICINE

## 2024-06-11 PROCEDURE — 82247 BILIRUBIN TOTAL: CPT | Mod: ZL | Performed by: FAMILY MEDICINE

## 2024-06-11 PROCEDURE — G0296 VISIT TO DETERM LDCT ELIG: HCPCS | Performed by: FAMILY MEDICINE

## 2024-06-11 PROCEDURE — 84484 ASSAY OF TROPONIN QUANT: CPT | Mod: ZL | Performed by: FAMILY MEDICINE

## 2024-06-11 RX ORDER — RESPIRATORY SYNCYTIAL VIRUS VACCINE 120MCG/0.5
0.5 KIT INTRAMUSCULAR ONCE
Qty: 1 EACH | Refills: 0 | Status: CANCELLED | OUTPATIENT
Start: 2024-06-11 | End: 2024-06-11

## 2024-06-11 RX ORDER — LANOLIN ALCOHOL/MO/W.PET/CERES
1000 CREAM (GRAM) TOPICAL DAILY
Qty: 90 TABLET | Refills: 4 | Status: SHIPPED | OUTPATIENT
Start: 2024-06-11

## 2024-06-11 RX ORDER — NITROGLYCERIN 0.4 MG/1
0.4 TABLET SUBLINGUAL EVERY 5 MIN PRN
Qty: 20 TABLET | Refills: 11 | Status: SHIPPED | OUTPATIENT
Start: 2024-06-11

## 2024-06-11 RX ORDER — OXYCODONE AND ACETAMINOPHEN 5; 325 MG/1; MG/1
1 TABLET ORAL EVERY 12 HOURS PRN
Qty: 60 TABLET | Refills: 0 | Status: SHIPPED | OUTPATIENT
Start: 2024-06-11

## 2024-06-11 ASSESSMENT — PAIN SCALES - GENERAL: PAINLEVEL: MILD PAIN (3)

## 2024-06-11 NOTE — TELEPHONE ENCOUNTER
Patient/daughter previously advised during triage call to present to ER if chest pain recurred and that if Dr. Vega wanted him to present to clinic earlier than the scheduled appointment writer would call to inform. They were otherwise instructed to arrive at 10:25 AM. This encounter closed. Avelina Starr RN on 6/11/2024 at 8:48 AM

## 2024-06-11 NOTE — PROGRESS NOTES
"Nursing Notes:   Anat Olivares LPN  6/11/2024 10:45 AM  Sign at exiting of workspace  Chief Complaint   Patient presents with    Chest Pain     Lungs ache and nauseated for 3 months   He states he just have not felt good for the past 3 months. He states he has had pain in his chest and lungs and has felt nauseated. He fell asleep last night before he took his blood pressure medication and his blood pressure was high this morning.  Anat Olivares LPN..................6/11/2024   10:45 AM'      Initial BP (!) 160/72   Pulse 69   Temp 97  F (36.1  C) (Temporal)   Resp 18   Ht 1.778 m (5' 10\")   Wt 97.1 kg (214 lb)   SpO2 96%   BMI 30.71 kg/m   Estimated body mass index is 30.71 kg/m  as calculated from the following:    Height as of this encounter: 1.778 m (5' 10\").    Weight as of this encounter: 97.1 kg (214 lb).  Medication Review: complete    The next two questions are to help us understand your food security.  If you are feeling you need any assistance in this area, we have resources available to support you today.          2/29/2024   SDOH- Food Insecurity   Within the past 12 months, did you worry that your food would run out before you got money to buy more? N   Within the past 12 months, did the food you bought just not last and you didn t have money to get more? N         Health Care Directive:  Patient does not have a Health Care Directive or Living Will: Discussed advance care planning with patient; information given to patient to review.    Anat Olivares LPN          Monica Diaz is a 78 year old, presenting for the following health issues:  Chest Pain (Lungs ache and nauseated for 3 months)      6/11/2024    10:35 AM   Additional Questions   Roomed by Anat Olivares LPN   Accompanied by Daughter Sadaf Diaz is here today for a complaint of chest pain.  Has had this for about a month.  Worse with exertion.  If he is fishing, he is gone for 12-27 hours.  He is getting the chest " pain when he is excited realing in a fish.  He takes nitroglycerin for this and it does relieve his pain.  The pain usually goes away after 15-20 minutes.  Has had a couple of heart attacks in the past.  One was in 2010, the other was in 2015.  Had stents placed x 2.  No history of bypass.  He had a stress test at [facility] about a year ago.  He was told that 3 arteries that had mild blockages, but didn't know if they were able to be stented.  They had recommended that he be treated with medications instead, but they made him feel sick and he stopped them.  He doesn't know which they were.  He is quite active.  He lands his boat every time he fishes.  He does a lot of remodeling around his home.  He deer hunts every year.  The last time he had the chest pain was 4-5 days ago.  Doesn't have the pain today.  He had smoked in the past, but quit over 40 years ago.  No cough other than to just clear phlegm.  He has not taken his blood pressure medications yet today.    He reports that he needs a refill of his adderall and ritalin, which he has used for many years to treat his narcolepsy.    He has been on long term percocet for chronic back pain as well.  His primary care provider Juan Lui MD is out of the clinic currently on maternity leave.    He has a history of diabetes and is due for an A1c.  He is no longer taking metformin.    He needs a refill of B12.    Has a history of tobacco use.  He had smoked 4 ppd for about 20 years.  Due for screening chest CT later this year.    History of Present Illness       Back Pain:  He presents for follow up of back pain. Patient's back pain is a chronic problem.  Location of back pain:  Right middle of back, left middle of back, right side of neck, left side of neck and left shoulder  Description of back pain: dull ache, gnawing and sharp  Back pain spreads: left buttocks, left shoulder, right side of neck and left side of neck    Since patient first noticed back  pain, pain is: always present, but gets better and worse  Does back pain interfere with his job:  Yes       Hypertension: He presents for follow up of hypertension.  He does not check blood pressure  regularly outside of the clinic. Outside blood pressures have been over 140/90. He does not follow a low salt diet.     Vascular Disease:  He presents for follow up of vascular disease.      He takes daily aspirin.    Reason for visit:  Shoulder pain , heart, meds., just dont feel right inside, lungs soar, just dont feel good    He eats 0-1 servings of fruits and vegetables daily.He consumes 2 sweetened beverage(s) daily.He exercises with enough effort to increase his heart rate 60 or more minutes per day.  He exercises with enough effort to increase his heart rate 4 days per week. He is missing 2 dose(s) of medications per week.  He is not taking prescribed medications regularly due to other.         Pain History:  When did you first notice your pain? Chronic, many years ago   Have you seen this provider for your pain in the past? Yes - Dr. Nick Otero  Where in your body do you have pain? back  Are you seeing anyone else for your pain? Follows with Dr. Nick Otero (on maternity leave).              Chronic Pain Follow Up:    Location of pain: see above  Analgesia/pain control:    - Recent changes:  no    - Overall control: Tolerable with discomfort    - Current treatments: percocet   Adherence:     - Do you ever take more pain medicine than prescribed? No     Adverse effects: No   PDMP Review         Value Time User    State PDMP site checked  Yes 6/11/2024 11:20 AM Brenna Vega MD          Last CSA Agreement:   CSA -- Patient Level:     [Media Unavailable] Controlled Substance Agreement - Opioid - Scan on 11/29/2023 12:46 PM       Last UDS: 6/11/2024              Review of Systems  Constitutional, HEENT, cardiovascular, pulmonary, GI, , musculoskeletal, neuro, skin, endocrine and psych systems are  "negative, except as otherwise noted.      Objective    BP (!) 160/72   Pulse 69   Temp 97  F (36.1  C) (Temporal)   Resp 18   Ht 1.778 m (5' 10\")   Wt 97.1 kg (214 lb)   SpO2 96%   BMI 30.71 kg/m    Body mass index is 30.71 kg/m .  Physical Exam  Constitutional:       Appearance: Normal appearance.   HENT:      Head: Normocephalic.   Eyes:      Extraocular Movements: Extraocular movements intact.      Pupils: Pupils are equal, round, and reactive to light.   Cardiovascular:      Rate and Rhythm: Normal rate and regular rhythm.      Heart sounds: Normal heart sounds. No murmur heard.  Pulmonary:      Effort: Pulmonary effort is normal.      Breath sounds: Normal breath sounds. No wheezing, rhonchi or rales.   Musculoskeletal:      Cervical back: Normal range of motion and neck supple.      Right lower leg: Edema (trace) present.      Left lower leg: Edema (trace) present.   Lymphadenopathy:      Cervical: No cervical adenopathy.   Neurological:      Mental Status: He is alert.   Psychiatric:         Mood and Affect: Mood normal.         Behavior: Behavior normal.        PDMP Review         Value Time User    State PDMP site checked  Yes 6/11/2024 11:20 AM Brenna Vega MD               ICD-10-CM    1. Unstable angina (H)  I20.0 Adult Cardiology al [facility] Referral     Comprehensive metabolic panel     CBC and Differential     nitroGLYcerin (NITROSTAT) 0.4 MG sublingual tablet     Troponin T, High Sensitivity     Comprehensive metabolic panel     CBC and Differential     Troponin T, High Sensitivity      2. Supraventricular tachycardia (H24)  I47.10       3. Primary hypertension  I10 Aldosterone     Renin activity     Aldosterone Renin Ratio     Aldosterone Renin Ratio     CANCELED: Aldosterone     CANCELED: Renin activity      4. Chronic narcotic dependence (H)  F11.20 oxyCODONE-acetaminophen (PERCOCET) 5-325 MG tablet     Drug Confirmation Panel Urine with Creat     Drug Confirmation Panel " Urine with Creat      5. Continuous opioid dependence (H)  F11.20       6. Primary narcolepsy without cataplexy  G47.419 Adult Sleep Eval & Management  Referral      7. Type 2 diabetes mellitus with other specified complication, with long-term current use of insulin (H)  E11.69 HEMOGLOBIN A1C    Z79.4 HEMOGLOBIN A1C      8. Vitamin B12 deficiency (non anemic)  E53.8 cyanocobalamin (VITAMIN B-12) 1000 MCG tablet      9. Personal history of tobacco use  Z87.891 Prof fee: Shared Decision Making for Lung Cancer Screening     CT Chest Lung Cancer Scrn Low Dose wo          He has a history of unstable angina and has had escalating chest pain over the last couple of months.  Would recommend referral back to cardiology to discuss further treatment/evaluation.  He has been intolerant of isosorbide as well as Ranexa.  He did have a cardiac catheterization more recently which showed nonocclusive disease, with his worst stenosis being in the 50 to 60% range.  When he last saw cardiology in October at [facility], it was recommended that he continue atenolol and atorvastatin.  No follow-up recommendations were made beyond that in the note.  This does not appear to be currently active at this point.  Follow-up with cardiology recommended.  Blood pressure is high today, but he has not taken his blood pressure meds yet today.  Follow-up on this when he sees cardiology in the near future.   was reviewed and is appropriate.  Urine drug screen was updated today.  Last contract was completed on 11/29/2023.  1 month of Percocet 5/325 #60 given today.  Recommended scheduling follow-up appointments with his PCP in 1 month when she is back from maternity leave.  See #4.  I recommended that he not take his stimulants for the time being until he is able to be evaluated by cardiology.  I also did refer him to a sleep medicine provider for ongoing management of this issue, particularly if it is recommended that he not take  stimulants long-term.  A1c completed today.  B12 refilled today.  Order for CT scan to screen for lung cancer placed.    He declines hepatitis C screening, hepatitis A vaccine, RSV vaccine, Shingrix vaccine.  COVID vaccine deferred due to lack of supply in clinic.    Return in about 27 days (around 7/8/2024) for Chronic Pain Med Refill, Diabetic check, needs 40 minute appt.     55 minutes spent in review of chart, interviewing patient, examining patient, discussing plan, reviewing results and completing note on the date of encounter.    The longitudinal plan of care for the diagnosis(es)/condition(s) as documented were addressed during this visit. Due to the added complexity in care, I will continue to support Joe in the subsequent management and with ongoing continuity of care.  Brenna Vega MD     Lung Cancer Screening Shared Decision Making Visit     Joe Bearden, a 78 year old male, is eligible for lung cancer screening    History   Smoking Status    Former    Types: Cigarettes   Smokeless Tobacco    Former       I have discussed with patient the risks and benefits of screening for lung cancer with low-dose CT.     The risks include:    radiation exposure: one low dose chest CT has as much ionizing radiation as about 15 chest x-rays, or 6 months of background radiation living in [place]      false positives: most findings/nodules are NOT cancer, but some might still require additional diagnostic evaluation, including biopsy    over-diagnosis: some slow growing cancers that might never have been clinically significant will be detected and treated unnecessarily     The benefit of early detection of lung cancer is contingent upon adherence to annual screening or more frequent follow up if indicated.     Furthermore, to benefit from screening, Joe must be willing and able to undergo diagnostic procedures, if indicated. Although no specific guide is available for determining severity of  comorbidities, it is reasonable to withhold screening in patients who have greater mortality risk from other diseases.     We did discuss that the best way to prevent lung cancer is to not smoke.    Some patients may value a numeric estimation of lung cancer risk when evaluating if lung cancer screening is right for them, here is one calculator:    ShouldIScreen

## 2024-06-11 NOTE — NURSING NOTE
"Chief Complaint   Patient presents with    Chest Pain     Lungs ache and nauseated for 3 months   He states he just have not felt good for the past 3 months. He states he has had pain in his chest and lungs and has felt nauseated. He fell asleep last night before he took his blood pressure medication and his blood pressure was high this morning.  Anat Olivares LPN..................6/11/2024   10:45 AM'      Initial BP (!) 160/72   Pulse 69   Temp 97  F (36.1  C) (Temporal)   Resp 18   Ht 1.778 m (5' 10\")   Wt 97.1 kg (214 lb)   SpO2 96%   BMI 30.71 kg/m   Estimated body mass index is 30.71 kg/m  as calculated from the following:    Height as of this encounter: 1.778 m (5' 10\").    Weight as of this encounter: 97.1 kg (214 lb).  Medication Review: complete    The next two questions are to help us understand your food security.  If you are feeling you need any assistance in this area, we have resources available to support you today.          2/29/2024   SDOH- Food Insecurity   Within the past 12 months, did you worry that your food would run out before you got money to buy more? N   Within the past 12 months, did the food you bought just not last and you didn t have money to get more? N         Health Care Directive:  Patient does not have a Health Care Directive or Living Will: Discussed advance care planning with patient; information given to patient to review.    Anat Olivares LPN      "

## 2024-06-11 NOTE — TELEPHONE ENCOUNTER
Dr. Vega -   You see patient today at 10:40. Please advise if you would like him to arrive any earlier for work-up prior. His home BP during triage was 164/69 HR 66 and he is currently asymptomatic. Daughter is concerned about four incidents in the last month of chest pain relieved by nitroglycerin.   Avelina Starr RN on 6/11/2024 at 7:46 AM      Reason for Disposition   Chest pain(s) lasting a few seconds persists > 3 days    Additional Information   Negative: Followed an injury to chest   Negative: SEVERE chest pain   Negative: Pain also in shoulder(s) or arm(s) or jaw   Negative: Difficulty breathing   Negative: Cocaine use within last 3 days   Negative: Major surgery in the past month   Negative: Hip or leg fracture (broken bone) in past month (or had cast on leg or ankle in past month)   Negative: Illness requiring prolonged bedrest in past month (e.g., immobilization, long hospital stay)   Negative: Long-distance travel in past month (e.g., car, bus, train, plane; with trip lasting 6 or more hours)   Negative: History of prior 'blood clot' in leg or lungs (i.e., deep vein thrombosis, pulmonary embolism)   Negative: History of inherited increased risk of blood clots (e.g., Factor 5 Leiden, Anti-thrombin 3, Protein C or Protein S deficiency, Prothrombin mutation)   Negative: Cancer treatment in the past two months (or has cancer now)   Negative: Heart beating irregularly or very rapidly   Negative: Chest pain lasting longer than 5 minutes and occurred in last 3 days (72 hours) (Exception: Feels exactly the same as previously diagnosed heartburn and has accompanying sour taste in mouth.)   Negative: Chest pain or 'angina' comes and goes and is happening more often (increasing in frequency) or getting worse (increasing in severity) (Exception: Chest pains that last only a few seconds.)   Negative: Dizziness or lightheadedness   Negative: Coughing up blood   Negative: Patient sounds very sick or weak to  "the triager   Negative: Patient says chest pain feels exactly the same as previously diagnosed 'heartburn' and describes burning in chest and accompanying sour taste in mouth   Negative: Fever > 100.4 F (38.0 C)    Answer Assessment - Initial Assessment Questions  1. LOCATION: \"Where does it hurt?\"        Around his heart and lungs get tight     2. RADIATION: \"Does the pain go anywhere else?\" (e.g., into neck, jaw, arms, back)      Down the left arm and neck     3. ONSET: \"When did the chest pain begin?\" (Minutes, hours or days)       In the last month it happened 4 times     4. PATTERN: \"Does the pain come and go, or has it been constant since it started?\"  \"Does it get worse with exertion?\"       The last time it happened was he was fishing in his boat alone     5. DURATION: \"How long does it last\" (e.g., seconds, minutes, hours)      \"Not very long because he takes his nitroglycerin\"     6. SEVERITY: \"How bad is the pain?\"  (e.g., Scale 1-10; mild, moderate, or severe)     - MILD (1-3): doesn't interfere with normal activities      - MODERATE (4-7): interferes with normal activities or awakens from sleep     - SEVERE (8-10): excruciating pain, unable to do any normal activities        At it's worst before taking nitroglycerine it's a 2-3, but daughter questions this     7. CARDIAC RISK FACTORS: \"Do you have any history of heart problems or risk factors for heart disease?\" (e.g., angina, prior heart attack; diabetes, high blood pressure, high cholesterol, smoker, or strong family history of heart disease)      Diabetic II, HTN, high cholesterol, unsure of family history     8. PULMONARY RISK FACTORS: \"Do you have any history of lung disease?\"  (e.g., blood clots in lung, asthma, emphysema, birth control pills)      Unsure     9. CAUSE: \"What do you think is causing the chest pain?\"      Unsure     10. OTHER SYMPTOMS: \"Do you have any other symptoms?\" (e.g., dizziness, nausea, vomiting, sweating, fever, difficulty " "breathing, cough)        Sweaty, short of breath     11. PREGNANCY: \"Is there any chance you are pregnant?\" \"When was your last menstrual period?\"        no    Protocols used: Chest Pain-A-OH    "

## 2024-06-11 NOTE — TELEPHONE ENCOUNTER
He can just come at the time of his appointment.  I have a full schedule and it's unlikely that I would be able to see him any earlier than his scheduled time.  If he develops recurrent chest pain prior to his appointment, would recommend going directly to the Emergency Department for evaluation.  Brenna Vega MD

## 2024-06-11 NOTE — PATIENT INSTRUCTIONS
Stop Narcolepsy medications (Adderall/ritalin).  Lung Cancer Screening   Frequently Asked Questions  If you are at high-risk for lung cancer, getting screened with low-dose computed tomography (LDCT) every year can help save your life. This handout offers answers to some of the most common questions about lung cancer screening. If you have other questions, please call [phone] ([phone]).     What is it?  Lung cancer screening uses special X-ray technology to create an image of your lung tissue. The exam is quick and easy and takes less than 10 seconds. We don t give you any medicine or use any needles. You can eat before and after the exam. You don t need to change your clothes as long as the clothing on your chest doesn t contain metal. But, you do need to be able to hold your breath for at least 6 seconds during the exam.    What is the goal of lung cancer screening?  The goal of lung cancer screening is to save lives. Many times, lung cancer is not found until a person starts having physical symptoms. Lung cancer screening can help detect lung cancer in the earliest stages when it may be easier to treat.    Who should be screened for lung cancer?  We suggest lung cancer screening for anyone who is at high-risk for lung cancer. You are in the high-risk group if you:      are between the ages of 55 and 79, and    have smoked at least 1 pack of cigarettes a day for 20 or more years, and    still smoke or have quit within the past 15 years.    However, if you have a new cough or shortness of breath, you should talk to your doctor before being screened.    Why does it matter if I have symptoms?  Certain symptoms can be a sign that you have a condition in your lungs that should be checked and treated by your doctor. These symptoms include fever, chest pain, a new or changing cough, shortness of breath that you have never felt before, coughing up blood or unexplained weight loss. Having any of these  symptoms can greatly affect the results of lung cancer screening.       Should all smokers get an LDCT lung cancer screening exam?  It depends. Lung cancer screening is for a very specific group of men and women who have a history of heavy smoking over a long period of time (see  Who should be screened for lung cancer  above).  I am in the high-risk group, but have been diagnosed with cancer in the past. Is LDCT lung cancer screening right for me?  In some cases, you should not have LDCT lung screening, such as when your doctor is already following your cancer with CT scan studies. Your doctor will help you decide if LDCT lung screening is right for you.  Do I need to have a screening exam every year?  Yes. If you are in the high-risk group described earlier, you should get an LDCT lung cancer screening exam every year until you are 79, or are no longer willing or able to undergo screening and possible procedures to diagnose and treat lung cancer.  How effective is LDCT at preventing death from lung cancer?  Studies have shown that LDCT lung cancer screening can lower the risk of death from lung cancer by 20 percent in people who are at high-risk.  What are the risks?  There are some risks and limitations of LDCT lung cancer screening. We want to make sure you understand the risks and benefits, so please let us know if you have any questions. Your doctor may want to talk with you more about these risks.    Radiation exposure: As with any exam that uses radiation, there is a very small increased risk of cancer. The amount of radiation in LDCT is small--about the same amount a person would get from a mammogram. Your doctor orders the exam when he or she feels the potential benefits outweigh the risks.    False negatives: No test is perfect, including LDCT. It is possible that you may have a medical condition, including lung cancer, that is not found during your exam. This is called a false negative result.    False  positives and more testing: LDCT very often finds something in the lung that could be cancer, but in fact is not. This is called a false positive result. False positive tests often cause anxiety. To make sure these findings are not cancer, you may need to have more tests. These tests will be done only if you give us permission. Sometimes patients need a treatment that can have side effects, such as a biopsy. For more information on false positives, see  What can I expect from the results?     Findings not related to lung cancer: Your LDCT exam also takes pictures of areas of your body next to your lungs. In a very small number of cases, the CT scan will show an abnormal finding in one of these areas, such as your kidneys, adrenal glands, liver or thyroid. This finding may not be serious, but you may need more tests. Your doctor can help you decide what other tests you may need, if any.  What can I expect from the results?  About 1 out of 4 LDCT exams will find something that may need more tests. Most of the time, these findings are lung nodules. Lung nodules are very small collections of tissue in the lung. These nodules are very common, and the vast majority--more than 97 percent--are not cancer (benign). Most are normal lymph nodes or small areas of scarring from past infections.  But, if a small lung nodule is found to be cancer, the cancer can be cured more than 90 percent of the time. To know if the nodule is cancer, we may need to get more images before your next yearly screening exam. If the nodule has suspicious features (for example, it is large, has an odd shape or grows over time), we will refer you to a specialist for further testing.  Will my doctor also get the results?  Yes. Your doctor will get a copy of your results.  Is it okay to keep smoking now that there s a cancer screening exam?  No. Tobacco is one of the strongest cancer-causing agents. It causes not only lung cancer, but other cancers and  cardiovascular (heart) diseases as well. The damage caused by smoking builds over time. This means that the longer you smoke, the higher your risk of disease. While it is never too late to quit, the sooner you quit, the better.  Where can I find help to quit smoking?  The best way to prevent lung cancer is to stop smoking. If you have already quit smoking, congratulations and keep it up! For help on quitting smoking, please call [employer] at 8-638-QUITNOW ([phone]) or the [employer] at 1-361.719.5940 to find local resources near you.  One-on-one health coaching:  If you d prefer to work individually with a health care provider on tobacco cessation, we offer:      Medication Therapy Management:  Our specially trained pharmacists work closely with you and your doctor to help you quit smoking.  Call [phone] or [phone] (toll free).

## 2024-06-11 NOTE — LETTER
Joe Bearden  [address]   [place] [address]    6/18/2024      Dear Mr. Bearden,      I wanted to let you know about your recent labs.      The cardiac enzyme test that we had done in clinic came back normal indicating that you are not in the midst of a heart attack at that particular time.    Your A1c was 6.6, which is in good range.  I would recommend that you have another diabetic check with labs in 3 to 6 months.    Your aldosterone renin ratio was normal.  Your aldosterone level and renin level was normal as well.    Your comprehensive metabolic profile showed that your creatinine was slightly elevated at 1.20.  However your GFR, which is an overall assessment of your kidney function was normal.  This indicates that you likely have some very mild chronic kidney disease.  I would avoid regular use of NSAIDs such as ibuprofen and Aleve to help protect your kidneys from further damage in the future.  Your glucose was 133.  The mildly elevated carbon dioxide may be related to your history of smoking.    Your complete blood count showed that you have a very mildly decreased hemoglobin, which indicates that you are mildly anemic.  This is stable over the last few months.    Your urine drug screen showed the presence of red O[place]nyk acid, which would be expected.    Please contact us at [phone] with any questions or concerns that you have.    I have attached your lab results for your records.        Sincerely,         Brenna Vega MD     Resulted Orders   HEMOGLOBIN A1C   Result Value Ref Range    Hemoglobin A1C 6.6 (H) 4.0 - 6.2 %   Comprehensive metabolic panel   Result Value Ref Range    Sodium 141 135 - 145 mmol/L      Comment:      Reference intervals for this test were updated on 09/26/2023 to more accurately reflect our healthy population. There may be differences in the flagging of prior results with similar values performed with this method. Interpretation of those  prior results can be made in the context of the updated reference intervals.     Potassium 4.1 3.4 - 5.3 mmol/L    Carbon Dioxide (CO2) 30 (H) 22 - 29 mmol/L    Anion Gap 8 7 - 15 mmol/L    Urea Nitrogen 18.2 8.0 - 23.0 mg/dL    Creatinine 1.20 (H) 0.67 - 1.17 mg/dL    GFR Estimate 62 >60 mL/min/1.73m2    Calcium 9.0 8.8 - 10.2 mg/dL    Chloride 103 98 - 107 mmol/L    Glucose 133 (H) 70 - 99 mg/dL    Alkaline Phosphatase 105 40 - 150 U/L    AST 19 0 - 45 U/L      Comment:      Reference intervals for this test were updated on 6/12/2023 to more accurately reflect our healthy population. There may be differences in the flagging of prior results with similar values performed with this method. Interpretation of those prior results can be made in the context of the updated reference intervals.    ALT 18 0 - 70 U/L      Comment:      Reference intervals for this test were updated on 6/12/2023 to more accurately reflect our healthy population. There may be differences in the flagging of prior results with similar values performed with this method. Interpretation of those prior results can be made in the context of the updated reference intervals.      Protein Total 7.5 6.4 - 8.3 g/dL    Albumin 4.2 3.5 - 5.2 g/dL    Bilirubin Total 0.6 <=1.2 mg/dL   Troponin T, High Sensitivity   Result Value Ref Range    Troponin T, High Sensitivity 13 <=22 ng/L      Comment:      Either a High Sensitivity Troponin T baseline (0 hours) value = 100 ng/L, or an increase in High Sensitivity Troponin T = 7 ng/L at 2 hours compared to 0 hours (2-0 hours), suggests myocardial injury, and urgent clinical attention is required.    If the 2-0 hours increase is <7 ng/L, a High Sensitivity Troponin T result above gender-specific reference ranges warrants further evaluation.   Recommendations for further evaluation include correlation with clinical decision-making tool (e.g., HEART), a 3rd High Sensitivity Troponin T test 2 hours after the 2nd (a 20%  change from baseline would represent concern), admission for observation, close PCC/cardiology follow-up, or urgent outpatient provocative testing.   Aldosterone   Result Value Ref Range    Aldosterone 9.0 0.0 - 31.0 ng/dL      Comment:      For screening of primary aldosteronism a positive test result is defined by most experts as: Plasma renin activity (PRA) <1.0 ng/mL/hour AND plasma aldosterone concentration (PAC) >=10 ng/dL OR aldosterone to renin ratio >=20 with PAC >=10 ng/dL   Renin activity   Result Value Ref Range    Renin Activity 0.4 ng/mL/hr      Comment:      INTERPRETIVE INFORMATION: Renin Activity    Adult, Normal sodium diet:    Supine ................. 0.2-1.6 ng/mL/hr    Upright ................ 0.5-4.0 ng/mL/hr    Children, Normal sodium diet, Supine:    Cerro (1-7 days) ..... 2.0-35.0 ng/mL/hr    Cord blood ............. 4.0-32.0 ng/mL/hr    1-12 mos ............... 2.4-37.0 ng/mL/hr    13 mos-3 yrs ........... 1.7-11.2 ng/mL/hr    4-5 yrs ................ 1.0- 6.5 ng/mL/hr    6-10 yrs ............... 0.5- 5.9 ng/mL/hr    11-15 yrs .............. 0.5- 3.3 ng/mL/hr    Children, normal sodium diet, Upright:    0-3 yrs ................ Not Available    4-5 yrs ................ Less than or equal to 15 ng/mL/hr    6-10 yrs ............... Less than or equal to 17 ng/mL/hr    11-15 yrs .............. Less than or equal to 16 ng/mL/hr    Plasma renin activity measures enzyme ability to convert   angiotensinogen to angiotensin I and is limited by the   availability of angiotensinogen. Plasma renin activity is   not an accurate indicator  of enzyme activity when   angiotensinogen is decreased.    This test was developed and its performance characteristics   determined by [email or web address]. It has not been cleared or   approved by the US Food and Drug Administration. This test   was performed in a CLIA certified laboratory and is   intended for clinical purposes.  Performed By: [email or web address]  [address]  [place] [address]  : Saran Saini MD, PhD  IA Number: [id number]   Aldosterone Renin Ratio   Result Value Ref Range    Aldosterone Renin Ratio 22.5 0.0 - 25.0      Comment:      For screening of primary aldosteronism a positive test result is defined by most experts as: Plasma renin activity (PRA) <1.0 ng/mL/hour AND plasma aldosterone concentration (PAC) >=10 ng/dL OR aldosterone to renin ratio >=20 with PAC >=10 ng/dL   CBC with platelets and differential   Result Value Ref Range    WBC Count 5.2 4.0 - 11.0 10e3/uL    RBC Count 4.18 (L) 4.40 - 5.90 10e6/uL    Hemoglobin 12.8 (L) 13.3 - 17.7 g/dL    Hematocrit 38.6 (L) 40.0 - 53.0 %    MCV 92 78 - 100 fL    MCH 30.6 26.5 - 33.0 pg    MCHC 33.2 31.5 - 36.5 g/dL    RDW 13.2 10.0 - 15.0 %    Platelet Count 215 150 - 450 10e3/uL    % Neutrophils 53 %    % Lymphocytes 32 %    % Monocytes 9 %    % Eosinophils 6 %    % Basophils 1 %    % Immature Granulocytes 0 %    NRBCs per 100 WBC 0 <1 /100    Absolute Neutrophils 2.7 1.6 - 8.3 10e3/uL    Absolute Lymphocytes 1.6 0.8 - 5.3 10e3/uL    Absolute Monocytes 0.4 0.0 - 1.3 10e3/uL    Absolute Eosinophils 0.3 0.0 - 0.7 10e3/uL    Absolute Basophils 0.0 0.0 - 0.2 10e3/uL    Absolute Immature Granulocytes 0.0 <=0.4 10e3/uL    Absolute NRBCs 0.0 10e3/uL   Urine Drug Confirmation Panel   Result Value Ref Range    Ritalinic Acid ng/mL 166 (H) <50 ng/mL    Ritalinic Acid 386 Absent ng/mg [creat]      Comment:      Ritalinic acid is an expected metabolite of methylphenidate.    Narrative    This test was developed and its performance characteristics determined by the [facility]  [facility]. It has not been cleared or approved by the FDA. The laboratory is regulated under CLIA as qualified to perform high-complexity testing. This test is used for clinical purposes. It should not be regarded as investigational or for research.    Drugs with  concentrations less than the cutoff will not be reported.  The drugs with applicable detection cutoff limits that are included within the Drug Confirmation Panel are:    The following drugs are detected with a cutoff of 3 ng/ml: FENTANYL    The following drugs are detected with a cutoff of 5 ng/mL: 6-ACETYLMORPHINE, BUPRENORPHINE, NALOXONE, NORBUPRENORPHINE, NORFENTANYL    The following drugs are detected with a cutoff of 10 ng/mL: SUFENTANIL    The following drugs are detected with a cutoff of 20 ng/mL: PCP (PHENCYCLIDINE)    The following drugs are detected with a cutoff of 50 ng/mL: 7-AMINOCLONAZEPAM, 7-AMINOFLUNITRAZEPAM, ALPRAZOLAM, AMPHETAMINE, A-OH-ALPRAZOLAM, A-OH-MIDAZOLAM, A-OH-TRIAZOLAM, BENZOYLECGONINE (Cocaine Metabolite), CLONAZEPAM, COCAETHYLENE (Cocaine Metabolite), CODEINE, DIAZEPAM, DIHYDROCODEINE, EDDP (Methadone Metabolite), HYDROCODONE, HYDROMORPHONE, LORAZEPAM, MDA (3,4-Methylenedioxyamphetamine), MDEA (3,8-Dzxisiqkcwwmgc-W-ethylcathinone), MDMA (Methylenedioxyamphetamine,Ecstasy), MEPERIDINE, METHADONE, METHAMPHETAMINE, METHYLPHENIDATE (Ritalin), MORPHINE, NALTREXONE, N-DESMETH-TAPENTADOL, NORCODEINE, NORDIAZEPAM, NORMEPERIDINE, O-PATRICK-TRAMADOL, OXAZEPAM, OXYCODONE, OXYMORPHONE, PROPOXYPHENE, RITALINIC ACID, TAPENTADOL, TEMAZEPAM, THEBAINE, TRAMADOL    The following drugs are detected with a cutoff of 100 ng/mL: GABAPENTIN, KETAMINE    The following drugs are detected with a cutoff of 200 ng/mL: PREGABALIN, XYLAZINE     Urine Creatinine for Drug Screen Panel   Result Value Ref Range    Creatinine Urine for Drug Screen 43 mg/dL      Comment:      The reference range has not been established for creatinine in random urines. The results should be integrated into the clinical context for interpretation.

## 2024-06-11 NOTE — TELEPHONE ENCOUNTER
Caller reporting the following red-flag symptom(s): chest pain-1 month.    Per the system red-flag symptom policy, patient was instructed to:  speak with a Registered Nurse    Action:  Message sent to the clinic    Patient's daughter stated she talked with a nurse yesterday who stated he needs to be seen in clinic. Patient does not wish to go to ED. Patient uses his nitroglycerin which helps relax him.  Patient is scheduled today with JOSE at 10:40.    Lou Holbrook on 6/11/2024 at 7:14 AM

## 2024-06-13 ENCOUNTER — TELEPHONE (OUTPATIENT)
Dept: CARDIOLOGY | Facility: OTHER | Age: 79
End: 2024-06-13
Payer: COMMERCIAL

## 2024-06-13 DIAGNOSIS — R07.9 CHEST PAIN, UNSPECIFIED TYPE: Primary | ICD-10-CM

## 2024-06-13 DIAGNOSIS — I25.118 CORONARY ARTERY DISEASE OF NATIVE HEART WITH STABLE ANGINA PECTORIS, UNSPECIFIED VESSEL OR LESION TYPE (H): ICD-10-CM

## 2024-06-13 NOTE — TELEPHONE ENCOUNTER
Referral to cardiology placed by Brenna Vega MD.  SHANA Joseph CNP looked at referral and requested records from [facility] including patient's most recent angiogram.  Records received and reviewed by SHANA Joseph CNP.  Per SHANA Joseph CNP I should ask patient if he would like to continue care at [facility] as he has been being followed for his unstable angina for some time now.  She would order a lexiscan stress test for patient in the mean time.  Patient notified of this and he states he doesn't want to drive to [facility] for cardiology anymore and is agreeable to doing the lexiscan stress test.  To SHANA Joseph CNP to place orders and address when patient should be seen by our cardiology department.  Dawna Koenig RN......June 13, 2024...4:14 PM

## 2024-06-14 LAB
ALDOST SERPL-MCNC: 9 NG/DL (ref 0–31)
RENIN PLAS-CCNC: 0.4 NG/ML/HR

## 2024-06-14 NOTE — TELEPHONE ENCOUNTER
Stress test ordered, patient to be scheduled in same day once stress test completed. Notes from [facility] to be scanned in.   Thanks,  SHANA Copeland CNP

## 2024-06-17 LAB — ALDOST/RENIN PLAS-RTO: 22.5 {RATIO} (ref 0–25)

## 2024-06-17 NOTE — TELEPHONE ENCOUNTER
Sue in scheduling notified of plan below and message forwarded to her so she can watch and schedule once stress test is scheduled.  Dawna Koenig RN......June 17, 2024...3:05 PM

## 2024-06-18 ENCOUNTER — DOCUMENTATION ONLY (OUTPATIENT)
Dept: OTHER | Facility: CLINIC | Age: 79
End: 2024-06-18
Payer: COMMERCIAL

## 2024-06-18 LAB
ME-PHENIDATE UR CFM-MCNC: 166 NG/ML
ME-PHENIDATE/CREAT UR: 386 NG/MG {CREAT}

## 2024-06-20 ENCOUNTER — HOSPITAL ENCOUNTER (OUTPATIENT)
Dept: CT IMAGING | Facility: OTHER | Age: 79
Discharge: HOME | End: 2024-06-20
Attending: FAMILY MEDICINE | Admitting: FAMILY MEDICINE
Payer: MEDICARE

## 2024-06-20 ENCOUNTER — ANCILLARY ORDERS (OUTPATIENT)
Dept: FAMILY MEDICINE | Facility: OTHER | Age: 79
End: 2024-06-20

## 2024-06-20 DIAGNOSIS — F11.20 CHRONIC NARCOTIC DEPENDENCE (H): ICD-10-CM

## 2024-06-20 DIAGNOSIS — I20.0 UNSTABLE ANGINA (H): Primary | ICD-10-CM

## 2024-06-20 DIAGNOSIS — Z87.891 PERSONAL HISTORY OF TOBACCO USE: ICD-10-CM

## 2024-06-20 DIAGNOSIS — E11.69 TYPE 2 DIABETES MELLITUS WITH OTHER SPECIFIED COMPLICATION, WITH LONG-TERM CURRENT USE OF INSULIN (H): ICD-10-CM

## 2024-06-20 DIAGNOSIS — G47.419 PRIMARY NARCOLEPSY WITHOUT CATAPLEXY: ICD-10-CM

## 2024-06-20 DIAGNOSIS — E53.8 VITAMIN B12 DEFICIENCY (NON ANEMIC): ICD-10-CM

## 2024-06-20 DIAGNOSIS — I47.10 SUPRAVENTRICULAR TACHYCARDIA: ICD-10-CM

## 2024-06-20 DIAGNOSIS — I10 PRIMARY HYPERTENSION: ICD-10-CM

## 2024-06-20 DIAGNOSIS — Z79.4 TYPE 2 DIABETES MELLITUS WITH OTHER SPECIFIED COMPLICATION, WITH LONG-TERM CURRENT USE OF INSULIN (H): ICD-10-CM

## 2024-06-20 DIAGNOSIS — F11.20 CONTINUOUS OPIOID DEPENDENCE (H): ICD-10-CM

## 2024-06-20 PROCEDURE — 71250 CT THORAX DX C-: CPT | Mod: ME

## 2024-06-21 ENCOUNTER — TELEPHONE (OUTPATIENT)
Dept: CARDIOLOGY | Facility: OTHER | Age: 79
End: 2024-06-21
Payer: COMMERCIAL

## 2024-06-21 NOTE — TELEPHONE ENCOUNTER
Patient verified .  Reminder call for stress test with instructions given.   Emphasis on NO caffeine for 12 hours.  No food for 4 hours.  Ok to have water and to take all medications as usual.  Patient verbalized understanding.

## 2024-06-25 ENCOUNTER — HOSPITAL ENCOUNTER (OUTPATIENT)
Dept: NUCLEAR MEDICINE | Facility: OTHER | Age: 79
Discharge: HOME | End: 2024-06-25
Attending: NURSE PRACTITIONER
Payer: MEDICARE

## 2024-06-25 VITALS — HEIGHT: 70 IN | WEIGHT: 214 LBS | BODY MASS INDEX: 30.64 KG/M2

## 2024-06-25 DIAGNOSIS — I25.118 CORONARY ARTERY DISEASE OF NATIVE HEART WITH STABLE ANGINA PECTORIS, UNSPECIFIED VESSEL OR LESION TYPE (H): ICD-10-CM

## 2024-06-25 DIAGNOSIS — R07.9 CHEST PAIN, UNSPECIFIED TYPE: ICD-10-CM

## 2024-06-25 LAB
CV BLOOD PRESSURE: 58 MMHG
CV STRESS MAX HR HE: 73
NUC STRESS EJECTION FRACTION: 64 %
RATE PRESSURE PRODUCT: NORMAL
STRESS ECHO BASELINE DIASTOLIC HE: 71
STRESS ECHO BASELINE HR: 63 BPM
STRESS ECHO BASELINE SYSTOLIC BP: 168
STRESS ECHO CALCULATED PERCENT HR: 51 %
STRESS ECHO LAST STRESS DIASTOLIC BP: 55
STRESS ECHO LAST STRESS SYSTOLIC BP: 176
STRESS ECHO POST ESTIMATED WORKLOAD: 1 METS
STRESS ECHO TARGET HR: 143

## 2024-06-25 PROCEDURE — A9500 TC99M SESTAMIBI: HCPCS | Performed by: NURSE PRACTITIONER

## 2024-06-25 PROCEDURE — 250N000011 HC RX IP 250 OP 636: Mod: JZ | Performed by: STUDENT IN AN ORGANIZED HEALTH CARE EDUCATION/TRAINING PROGRAM

## 2024-06-25 PROCEDURE — 93017 CV STRESS TEST TRACING ONLY: CPT

## 2024-06-25 PROCEDURE — 343N000001 HC RX 343: Performed by: NURSE PRACTITIONER

## 2024-06-25 PROCEDURE — 78452 HT MUSCLE IMAGE SPECT MULT: CPT | Mod: ME

## 2024-06-25 PROCEDURE — 93018 CV STRESS TEST I&R ONLY: CPT | Performed by: STUDENT IN AN ORGANIZED HEALTH CARE EDUCATION/TRAINING PROGRAM

## 2024-06-25 RX ORDER — REGADENOSON 0.08 MG/ML
0.4 INJECTION, SOLUTION INTRAVENOUS ONCE
Status: COMPLETED | OUTPATIENT
Start: 2024-06-25 | End: 2024-06-25

## 2024-06-25 RX ADMIN — KIT FOR THE PREPARATION OF TECHNETIUM TC99M SESTAMIBI 31.7 MILLICURIE: 1 INJECTION, POWDER, LYOPHILIZED, FOR SOLUTION PARENTERAL at 12:50

## 2024-06-25 RX ADMIN — REGADENOSON 0.4 MG: 0.08 INJECTION, SOLUTION INTRAVENOUS at 12:51

## 2024-06-25 RX ADMIN — KIT FOR THE PREPARATION OF TECHNETIUM TC99M SESTAMIBI 7.57 MILLICURIE: 1 INJECTION, POWDER, LYOPHILIZED, FOR SOLUTION PARENTERAL at 11:20

## 2024-06-25 NOTE — PROGRESS NOTES
1111 The patient arrived for a Lexiscan Cardiolite stress test.  The procedure, risks, and benefits were discussed with the patient ,and the consent was signed.  A saline lock was started,and the Cardiolite was injected by Nuclear Medicine.  The patient was taken to the waiting area, to await resting images at 1125.  1233 The patient returned from Nuclear Medicine and was prepped for the stress test.   The patient was administered the Lexiscan per procedure.  The patient tolerated the procedure.  He was given a snack and taken to Nuclear Medicine in stable condition for stress images.  The saline lock will be removed by Nuclear Medicine for proper disposal.  The patient was instructed that the ordering MD will call with results in one to two days.  Please see the chart for the complete test results.

## 2024-07-16 DIAGNOSIS — K59.1 FUNCTIONAL DIARRHEA: ICD-10-CM

## 2024-07-18 RX ORDER — LOPERAMIDE HCL 2 MG
CAPSULE ORAL
Qty: 270 CAPSULE | Refills: 0 | Status: SHIPPED | OUTPATIENT
Start: 2024-07-18

## 2024-07-22 ENCOUNTER — OFFICE VISIT (OUTPATIENT)
Dept: CARDIOLOGY | Facility: OTHER | Age: 79
End: 2024-07-22
Attending: NURSE PRACTITIONER
Payer: COMMERCIAL

## 2024-07-22 VITALS
SYSTOLIC BLOOD PRESSURE: 150 MMHG | TEMPERATURE: 97.2 F | RESPIRATION RATE: 16 BRPM | DIASTOLIC BLOOD PRESSURE: 62 MMHG | HEART RATE: 44 BPM | OXYGEN SATURATION: 97 % | BODY MASS INDEX: 31.28 KG/M2 | WEIGHT: 218 LBS

## 2024-07-22 DIAGNOSIS — I51.89 DIASTOLIC DYSFUNCTION: ICD-10-CM

## 2024-07-22 DIAGNOSIS — I25.118 CORONARY ARTERY DISEASE OF NATIVE ARTERY OF NATIVE HEART WITH STABLE ANGINA PECTORIS (H): Primary | ICD-10-CM

## 2024-07-22 DIAGNOSIS — Z98.890 S/P CORONARY ANGIOGRAM: ICD-10-CM

## 2024-07-22 DIAGNOSIS — Z95.5 HISTORY OF CORONARY ARTERY STENT PLACEMENT: ICD-10-CM

## 2024-07-22 DIAGNOSIS — I10 PRIMARY HYPERTENSION: ICD-10-CM

## 2024-07-22 DIAGNOSIS — E11.51 TYPE 2 DIABETES MELLITUS WITH PERIPHERAL VASCULAR DISEASE (H): ICD-10-CM

## 2024-07-22 DIAGNOSIS — E78.5 HYPERLIPIDEMIA LDL GOAL <70: ICD-10-CM

## 2024-07-22 DIAGNOSIS — R00.1 SINUS BRADYCARDIA: ICD-10-CM

## 2024-07-22 PROCEDURE — 93005 ELECTROCARDIOGRAM TRACING: CPT | Performed by: NURSE PRACTITIONER

## 2024-07-22 PROCEDURE — 93010 ELECTROCARDIOGRAM REPORT: CPT | Performed by: INTERNAL MEDICINE

## 2024-07-22 PROCEDURE — 99204 OFFICE O/P NEW MOD 45 MIN: CPT | Performed by: NURSE PRACTITIONER

## 2024-07-22 PROCEDURE — G0463 HOSPITAL OUTPT CLINIC VISIT: HCPCS

## 2024-07-22 RX ORDER — ASPIRIN 81 MG/1
81 TABLET ORAL DAILY
COMMUNITY

## 2024-07-22 RX ORDER — ASPIRIN 81 MG/1
81 TABLET ORAL DAILY
COMMUNITY
Start: 2024-07-22

## 2024-07-22 RX ORDER — CARVEDILOL 6.25 MG/1
6.25 TABLET ORAL 2 TIMES DAILY WITH MEALS
Qty: 180 TABLET | Refills: 3 | Status: SHIPPED | OUTPATIENT
Start: 2024-07-22

## 2024-07-22 ASSESSMENT — PAIN SCALES - GENERAL: PAINLEVEL: MODERATE PAIN (5)

## 2024-07-22 ASSESSMENT — ANXIETY QUESTIONNAIRES
7. FEELING AFRAID AS IF SOMETHING AWFUL MIGHT HAPPEN: NOT AT ALL
1. FEELING NERVOUS, ANXIOUS, OR ON EDGE: NOT AT ALL
3. WORRYING TOO MUCH ABOUT DIFFERENT THINGS: NOT AT ALL
4. TROUBLE RELAXING: NOT AT ALL
GAD7 TOTAL SCORE: 0
8. IF YOU CHECKED OFF ANY PROBLEMS, HOW DIFFICULT HAVE THESE MADE IT FOR YOU TO DO YOUR WORK, TAKE CARE OF THINGS AT HOME, OR GET ALONG WITH OTHER PEOPLE?: SOMEWHAT DIFFICULT
IF YOU CHECKED OFF ANY PROBLEMS ON THIS QUESTIONNAIRE, HOW DIFFICULT HAVE THESE PROBLEMS MADE IT FOR YOU TO DO YOUR WORK, TAKE CARE OF THINGS AT HOME, OR GET ALONG WITH OTHER PEOPLE: SOMEWHAT DIFFICULT
6. BECOMING EASILY ANNOYED OR IRRITABLE: NOT AT ALL
GAD7 TOTAL SCORE: 0
5. BEING SO RESTLESS THAT IT IS HARD TO SIT STILL: NOT AT ALL
GAD7 TOTAL SCORE: 0
7. FEELING AFRAID AS IF SOMETHING AWFUL MIGHT HAPPEN: NOT AT ALL
2. NOT BEING ABLE TO STOP OR CONTROL WORRYING: NOT AT ALL

## 2024-07-22 ASSESSMENT — PATIENT HEALTH QUESTIONNAIRE - PHQ9
10. IF YOU CHECKED OFF ANY PROBLEMS, HOW DIFFICULT HAVE THESE PROBLEMS MADE IT FOR YOU TO DO YOUR WORK, TAKE CARE OF THINGS AT HOME, OR GET ALONG WITH OTHER PEOPLE: SOMEWHAT DIFFICULT
SUM OF ALL RESPONSES TO PHQ QUESTIONS 1-9: 3
SUM OF ALL RESPONSES TO PHQ QUESTIONS 1-9: 3

## 2024-07-22 NOTE — PROGRESS NOTES
[facility] HEART CARE   CARDIOLOGY CONSULT     Joe Bearden   [address]   [place] [id number]    Juan Nava     Chief Complaint   Patient presents with    Consult     Unstable angina, stress test results        HPI:   Mr. Bearden is a 78 year old male who presents for cardiology evaluation with endorsed angina and abnormal stress test on 6/25/2024, known CAD history. Prior STEMI in 2015 resulting in PATRICK x 2 at [facility].  His past medical history includes DM 2 on insulin with PAD, hypertension, obesity, narcolepsy, hypothyroidism, chronic pain with documented opioid dependence, ADHD and remote history of tobacco use.    For management of hypertension, he has been on amlodipine 10 mg daily, atenolol 100 mg daily and losartan 100 mg daily.  He has been on atorvastatin 40 mg every night in the setting of DM2. Diabetes controlled on Trulicity and basaglar insulin.    As noted above, patient has an established CAD history. History of STEMI in May 2012 resulting in PATRICK to dLAD and OM1 on 5/29/2012.     He was last evaluated by cardiology at [facility] on 8/18/2023 with reports of chest pain at that time and subsequent repeat coronary angiogram revealing single-vessel CAD.  Patent stent to OM1 and distal LAD. iFR of OM1 was 1.0 which was not hemodynamically significant, iFR of LAD was 0.82 however, pullback assessment of iFR showed no acute jump, was more of a diffuse decline of iFR.  From the proximal to distal.  It was documented this would require longstanding from the proximal LAD to distal LAD if PCI indicated and would not be an ideal situation for percutaneous intervention.  Medical management of CAD was recommended.    Patient admits that he trialed Imdur for medical management and vasodilation which causes headaches and lightheadedness. He has been on Amlodipine 10 mg daily.    Patient was evaluated by PCP on 6/11/2024, he endorsed chest pain for the prior month which worsens with exertion.   He does take nitroglycerin which does resolve his symptoms.    NM Lexiscan stress test reviewed from 6/25/2024 revealing a medium sized area of infarction in the mid basal inferior and inferolateral segments of the LV. LVEF 58% at rest and increased to 64% at stress. Resting ECG with RBBB, LAFB. During stress he developed nonspecific T wave inversion in lead III and lead V4.     Today, patient denies any recurrence of angina symptoms. Admits that he would like to get back on Ritalin and Adderrall which was discontinued due to chest pains by primary care. Denies any increased dyspnea. No palpitations. Chronic LE edema controlled on Lasix 40 mg daily. Positive for fatigue and energy loss. No lightheadedness or syncope.     RELEVANT TESTING REVIEWED:  NM Lexiscan stress 6/25/2024    The nuclear stress test is abnormal.     There is a medium sized area of infarction in the mid to basal inferior   and inferolateral segment(s) of the left ventricle.     Left ventricular function is normal.     The left ventricular ejection fraction at rest is 58%.  The left   ventricular ejection fraction at stress is 64%.     There is no prior study for comparison.     Procedure: Lexiscan Cardiolite stress test     Findings: The patient had a  successful Lexiscan Cardiolite stress test   which was completed 6/25/2024.  The patient's max heart rate during the   test was 73.  The max blood pressure was 176/55.    Resting EKG showed   right bundle branch block, left anterior fascicular block, during the   stress portion of the exam the patient demonstrated nonspecific T wave   inversion in lead III, and nonspecific T wave changes in lead V4.     Sam Cobb MD on 6/25/2024 at 5:54 PM       PAST MEDICAL HISTORY:   No past medical history on file.       FAMILY HISTORY:   No family history on file.       PAST SURGICAL HISTORY:   No past surgical history on file.       SOCIAL HISTORY:   Social History     Socioeconomic History    Marital  status:    Tobacco Use    Smoking status: Former     Current packs/day: 0.00     Types: Cigarettes     Quit date: 1984     Years since quittin.5     Passive exposure: Past    Smokeless tobacco: Former   Vaping Use    Vaping status: Never Used     Social Determinants of Health     Financial Resource Strain: Low Risk  (2024)    Financial Resource Strain     Within the past 12 months, have you or your family members you live with been unable to get utilities (heat, electricity) when it was really needed?: No   Food Insecurity: Low Risk  (2024)    Food Insecurity     Within the past 12 months, did you worry that your food would run out before you got money to buy more?: No     Within the past 12 months, did the food you bought just not last and you didn t have money to get more?: No   Transportation Needs: Low Risk  (2024)    Transportation Needs     Within the past 12 months, has lack of transportation kept you from medical appointments, getting your medicines, non-medical meetings or appointments, work, or from getting things that you need?: No    Received [facility]    Social Connections   Interpersonal Safety: Low Risk  (2024)    Interpersonal Safety     Do you feel physically and emotionally safe where you currently live?: Yes     Within the past 12 months, have you been hit, slapped, kicked or otherwise physically hurt by someone?: No     Within the past 12 months, have you been humiliated or emotionally abused in other ways by your partner or ex-partner?: No   Housing Stability: Low Risk  (2024)    Housing Stability     Do you have housing? : Yes     Are you worried about losing your housing?: No          CURRENT MEDICATIONS:   Prior to Admission medications    Medication Sig Start Date End Date Taking? Authorizing Provider   allopurinol (ZYLOPRIM) 100 MG tablet Take 1 tablet (100 mg) by mouth daily 23   Juan Nava,  MD   amLODIPine (NORVASC) 10 MG tablet Take 1 tablet (10 mg) by mouth daily 2/29/24   Juan Nava MD   atenolol (TENORMIN) 100 MG tablet TAKE 1 TABLET (100 MG) BY MOUTH DAILY 5/24/24   Juan Nava MD   atorvastatin (LIPITOR) 40 MG tablet TAKE 1 TABLET BY MOUTH NIGHTLY AT BEDTIME NOTE DOSE INCREASE 11/17/23   Reported, Patient   co-enzyme Q-10 100 MG CAPS capsule Take 200 mg by mouth 7/20/23   Reported, Patient   CONTOUR NEXT TEST test strip TEST 4 TIMES/DAY.    Reported, Patient   cyanocobalamin (VITAMIN B-12) 1000 MCG tablet Take 1 tablet (1,000 mcg) by mouth daily 6/11/24   Brenna Vega MD   FLUoxetine (PROZAC) 40 MG capsule Take 1 capsule by mouth every morning 4/20/23   Reported, Patient   folic acid (FOLVITE) 1 MG tablet Take 1 tablet by mouth daily 11/18/23   Reported, Patient   furosemide (LASIX) 40 MG tablet TAKE 1 TABLET (40 MG) BY MOUTH EVERY MORNING 5/24/24   Faby Turcios APRN CNP   insulin glargine (BASAGLAR KWIKPEN) 100 UNIT/ML pen INJECT 10 UNITS SUBCUTANEOUS BEFORE BEDTIME. REPLACES MIXED INSULIN, START BASAGLAR WHEN STARTING TRULICITY 4/20/23   Reported, Patient   levothyroxine (SYNTHROID/LEVOTHROID) 200 MCG tablet TAKE 1 TABLET (200 MCG) BY MOUTH BEFORE BREAKFAST.    Reported, Patient   loperamide (IMODIUM) 2 MG capsule TAKE 2 CAPSULES IN THE MORNING AND 1 CAPSULE IN THE EVENING 7/18/24   Juan Nava MD   losartan (COZAAR) 100 MG tablet Take 1 tablet (100 mg) by mouth daily 11/29/23   Juan Nava MD   magnesium oxide (MAG-OX) 400 MG tablet TAKE 1 TAB BY MOUTH ONCE DAILY WITH FOOD 10/25/23   Reported, Patient   naloxone (NARCAN) 4 MG/0.1ML nasal spray Spray 1 spray (4 mg) into one nostril alternating nostrils once as needed for opioid reversal every 2-3 minutes until assistance arrives 11/29/23   Juan Nava MD   nitroGLYcerin (NITROSTAT) 0.4 MG sublingual tablet Place 1 tablet (0.4 mg) under the tongue every 5 minutes as  "needed for chest pain For chest pain place 1 tablet under the tongue every 5 minutes for 3 doses. If symptoms persist 5 minutes after 1st dose call 911. 6/11/24   Brenna Vega MD   nortriptyline (PAMELOR) 10 MG capsule TAKE 1 CAPSULE (10 MG) BY MOUTH AT BEDTIME 5/24/24   Faby Turcios APRN CNP   ondansetron (ZOFRAN) 4 MG tablet TAKE 1 TABLET BY MOUTH EVERY 8 HOURS AS NEEDED FOR NAUSEA AND VOMITING 8/30/22   Reported, Patient   oxyCODONE-acetaminophen (PERCOCET) 5-325 MG tablet Take 1 tablet by mouth every 12 hours as needed for pain 6/11/24   Brenna Vega MD   pantoprazole (PROTONIX) 20 MG EC tablet TAKE 1 TABLET (20 MG) BY MOUTH DAILY 5/24/24   Juan Nava MD   rOPINIRole (REQUIP) 2 MG tablet TAKE 1 TABLET (2 MG) BY MOUTH AT BEDTIME 5/24/24   Juan Nava MD   TRULICITY 1.5 MG/0.5ML pen Inject 1.5 mg Subcutaneous every 7 days 11/29/23   Juan Nava MD          ALLERGIES:   Allergies   Allergen Reactions    Baclofen Nausea    Carbidopa-Levodopa Other (See Comments)     Nausea and upset stomach, even on lowest dose    Gabapentin GI Disturbance and Other (See Comments)     \"Makes me like a zombie. I don't want to do nothing. I feel tired all the time and lay around in bed way past what I should.\" nausea          ROS:   CONSTITUTIONAL: No reported fever or chills. No changes in weight.  ENT: No visual disturbance, ear ache, epistaxis or sore throat.   CARDIOVASCULAR: No recurrence of chest pain or pressure reported. No palpitations or lower extremity edema.   RESPIRATORY: No increased dyspnea from baseline.  No cough or wheezing.  GI: No reported abdominal pain.   : No reported hematuria or dysuria.   NEUROLOGICAL: No headache, lightheadedness, dizziness, syncope, ataxia, paresthesias or weakness.   HEMATOLOGIC: No history of anemia. No bleeding or excessive bruising. No history of blood clots.   MUSCULOSKELETAL: No new complaints joint pain or swelling, " no muscle pain.  ENDOCRINOLOGIC: No temperature intolerance. No hair or skin changes.  SKIN: No abnormal rashes or sores, no unusual itching.      PHYSICAL EXAM:   BP (!) 150/62 (BP Location: Right arm, Patient Position: Sitting, Cuff Size: Adult Large)   Pulse (!) 44   Temp 97.2  F (36.2  C) (Temporal)   Resp 16   Wt 98.9 kg (218 lb)   SpO2 97%   BMI 31.28 kg/m    GENERAL: The patient is a well-developed, well-nourished, in no apparent distress.  HEENT: Head is normocephalic and atraumatic. Eyes are symmetrical with normal visual tracking. No icterus, no xanthelasmas. Nares appeared normal without nasal drainage. Mucous membranes are moist, no cyanosis.  NECK: Supple, no cervical bruits, JVP not visible.   CHEST/ LUNGS: Lungs clear to auscultation, no rales, rhonchi or wheezes, no use of accessory muscles, no retractions, respirations unlabored and normal respiratory rate.   CARDIO: Regular rate and rhythm normal with S1 and S2, no S3 or S4 and no murmur, click or rub. Precordium quiet with normal PMI.    ABD: Abdomen is nondistended.   EXTREMITIES: Trace lower extremity edema present.    MUSCULOSKELETAL: No visible joint swelling.   NEUROLOGIC: Alert and oriented X3. Normal speech, gait and affect. No focal neurologic deficits.   SKIN: No jaundice. No rashes or visible skin lesions present. No ecchymosis.     EKG:    Sinus bradycardia with sinus arrhythmia, rate 48 bpm.  Right bundle branch block with left anterior fascicular block.    LAB RESULTS:   Office Visit on 06/11/2024   Component Date Value Ref Range Status    Hemoglobin A1C 06/11/2024 6.6 (H)  4.0 - 6.2 % Final    Sodium 06/11/2024 141  135 - 145 mmol/L Final    Potassium 06/11/2024 4.1  3.4 - 5.3 mmol/L Final    Carbon Dioxide (CO2) 06/11/2024 30 (H)  22 - 29 mmol/L Final    Anion Gap 06/11/2024 8  7 - 15 mmol/L Final    Urea Nitrogen 06/11/2024 18.2  8.0 - 23.0 mg/dL Final    Creatinine 06/11/2024 1.20 (H)  0.67 - 1.17 mg/dL Final    GFR Estimate  06/11/2024 62  >60 mL/min/1.73m2 Final    Calcium 06/11/2024 9.0  8.8 - 10.2 mg/dL Final    Chloride 06/11/2024 103  98 - 107 mmol/L Final    Glucose 06/11/2024 133 (H)  70 - 99 mg/dL Final    Alkaline Phosphatase 06/11/2024 105  40 - 150 U/L Final    AST 06/11/2024 19  0 - 45 U/L Final    ALT 06/11/2024 18  0 - 70 U/L Final    Protein Total 06/11/2024 7.5  6.4 - 8.3 g/dL Final    Albumin 06/11/2024 4.2  3.5 - 5.2 g/dL Final    Bilirubin Total 06/11/2024 0.6  <=1.2 mg/dL Final    Troponin T, High Sensitivity 06/11/2024 13  <=22 ng/L Final    Aldosterone 06/11/2024 9.0  0.0 - 31.0 ng/dL Final    Renin Activity 06/11/2024 0.4  ng/mL/hr Final    Aldosterone Renin Ratio 06/11/2024 22.5  0.0 - 25.0 Final    WBC Count 06/11/2024 5.2  4.0 - 11.0 10e3/uL Final    RBC Count 06/11/2024 4.18 (L)  4.40 - 5.90 10e6/uL Final    Hemoglobin 06/11/2024 12.8 (L)  13.3 - 17.7 g/dL Final    Hematocrit 06/11/2024 38.6 (L)  40.0 - 53.0 % Final    MCV 06/11/2024 92  78 - 100 fL Final    MCH 06/11/2024 30.6  26.5 - 33.0 pg Final    MCHC 06/11/2024 33.2  31.5 - 36.5 g/dL Final    RDW 06/11/2024 13.2  10.0 - 15.0 % Final    Platelet Count 06/11/2024 215  150 - 450 10e3/uL Final    % Neutrophils 06/11/2024 53  % Final    % Lymphocytes 06/11/2024 32  % Final    % Monocytes 06/11/2024 9  % Final    % Eosinophils 06/11/2024 6  % Final    % Basophils 06/11/2024 1  % Final    % Immature Granulocytes 06/11/2024 0  % Final    NRBCs per 100 WBC 06/11/2024 0  <1 /100 Final    Absolute Neutrophils 06/11/2024 2.7  1.6 - 8.3 10e3/uL Final    Absolute Lymphocytes 06/11/2024 1.6  0.8 - 5.3 10e3/uL Final    Absolute Monocytes 06/11/2024 0.4  0.0 - 1.3 10e3/uL Final    Absolute Eosinophils 06/11/2024 0.3  0.0 - 0.7 10e3/uL Final    Absolute Basophils 06/11/2024 0.0  0.0 - 0.2 10e3/uL Final    Absolute Immature Granulocytes 06/11/2024 0.0  <=0.4 10e3/uL Final    Absolute NRBCs 06/11/2024 0.0  10e3/uL Final    Ritalinic Acid ng/mL 06/11/2024 166 (H)  <50 ng/mL  Final    Ritalinic Acid 06/11/2024 386  Absent ng/mg [creat] Final    Creatinine Urine for Drug Screen 06/11/2024 43  mg/dL Final          ASSESSMENT:   Joe Bearden presents for cardiology evaluation with endorsed angina and abnormal stress test on 6/25/2024, known CAD history. Prior STEMI in 2015 resulting in PATRICK x 2 at [facility].  His past medical history includes DM 2 on insulin with PAD, hypertension, obesity, narcolepsy, hypothyroidism, chronic pain with documented opioid dependence, ADHD and remote history of tobacco use.  Today, patient denies any recurrence of angina symptoms. Admits that he would like to get back on Ritalin and Adderrall which was discontinued due to chest pains by primary care. Denies any increased dyspnea. No palpitations. Chronic LE edema controlled on Lasix 40 mg daily. Positive for fatigue and energy loss. No lightheadedness or syncope.     1. Coronary artery disease of native artery of native heart with stable angina pectoris (H24)  2. History of coronary artery stent placement x 2 (5/29/2012)  3. S/P coronary angiogram- 8/18/2023 ([facility])- no intervention  4. Type 2 diabetes mellitus with peripheral vascular disease (H)  5. Primary hypertension  6. Hyperlipidemia LDL goal <70  7. Sinus bradycardia  8. Diastolic dysfunction    PLAN:   Patient has an established CAD history. History of STEMI in May 2012 resulting in PATRICK to dLAD and OM1 on 5/29/2012. Reviewed recent stress test with prior infarct, no evidence of ischemia.  Repeat coronary angiogram at [facility] in August 2023 revealing single-vessel CAD.  Patent stent to OM1 and distal LAD. iFR of OM1 was 1.0 which was not hemodynamically significant, iFR of LAD was 0.82 however, pullback assessment of iFR showed no acute jump, was more of a diffuse decline of iFR.  From the proximal to distal.  It was documented this would require longstanding from the proximal LAD to distal LAD if PCI indicated and would not be an ideal situation  for percutaneous intervention.  Medical management of CAD was recommended.  He did not tolerate Imdur, doing fine on Amlodipine 10 mg daily. Denies any recurrence of angina for over 1 month now. Consider adding Ranexa. if recurrence of angina.   Adjusted beta blocker from Atenolol 100 mg daily to Carvedilol 6.25 mg BID due to bradycardia and HTN. Continue beta blocker if tolerated for cardiac protection.  Continue on ASA 81 mg daily life long.   Continue on high intensity statin with LDL goal <70 achieved.   Good DM control on current medication regimen.   Reviewed TTE from [facility] on 10/6/2023- normal LV systolic function, no hemodynamically significant valvular abnormalities and no pericardial effusion.  Evidence to suggest LV diastolic dysfunction, he is continued on Lasix 40 mg daily.  Follow-up with cardiology in 3 months, sooner if needed.     Orders Placed This Encounter   Procedures    EKG 12-lead, tracing only (Same Day)       Thank you for allowing me to participate in the care of your patient. Please do not hesitate to contact me if you have any questions.     Total time 57 minutes on date of encounter spent reviewing records, face-to-face time performing HPI, physical exam, reviewing results of recent testing and counseling on the above diagnoses and recommended plan of care.    Jackeline Chanel, APRN CNP [facility]

## 2024-07-22 NOTE — PATIENT INSTRUCTIONS
Stop Atenolol at this time and start taking Carvedilol 6.25 mg twice daily.   No other medication changes today.   Continue on Aspirin 81 mg daily.   Follow-up with cardiology in 3 months, sooner if any recurrence of chest pains.

## 2024-07-22 NOTE — NURSING NOTE
"Chief Complaint   Patient presents with    Consult     Unstable angina, stress test results       Initial BP (!) 158/60 (BP Location: Right arm, Patient Position: Sitting, Cuff Size: Adult Large)   Pulse (!) 44   Temp 97.2  F (36.2  C) (Temporal)   Resp 16   Wt 98.9 kg (218 lb)   SpO2 97%   BMI 31.28 kg/m   Estimated body mass index is 31.28 kg/m  as calculated from the following:    Height as of 6/25/24: 1.778 m (5' 10\").    Weight as of this encounter: 98.9 kg (218 lb).  Meds Reconciled: complete  Pt is on Aspirin  Pt is not on a Statin  PHQ and/or MIRELA reviewed. Pt referred to PCP/MH Provider as appropriate.    Radha Roman LPN    "

## 2024-07-23 LAB
ATRIAL RATE - MUSE: 48 BPM
DIASTOLIC BLOOD PRESSURE - MUSE: NORMAL MMHG
INTERPRETATION ECG - MUSE: NORMAL
P AXIS - MUSE: 45 DEGREES
PR INTERVAL - MUSE: 208 MS
QRS DURATION - MUSE: 172 MS
QT - MUSE: 510 MS
QTC - MUSE: 455 MS
R AXIS - MUSE: -69 DEGREES
SYSTOLIC BLOOD PRESSURE - MUSE: NORMAL MMHG
T AXIS - MUSE: 7 DEGREES
VENTRICULAR RATE- MUSE: 48 BPM

## 2024-08-05 ENCOUNTER — OFFICE VISIT (OUTPATIENT)
Dept: FAMILY MEDICINE | Facility: OTHER | Age: 79
End: 2024-08-05
Attending: STUDENT IN AN ORGANIZED HEALTH CARE EDUCATION/TRAINING PROGRAM
Payer: COMMERCIAL

## 2024-08-05 VITALS
OXYGEN SATURATION: 98 % | HEIGHT: 70 IN | HEART RATE: 58 BPM | SYSTOLIC BLOOD PRESSURE: 130 MMHG | RESPIRATION RATE: 16 BRPM | BODY MASS INDEX: 31.42 KG/M2 | TEMPERATURE: 97 F | WEIGHT: 219.5 LBS | DIASTOLIC BLOOD PRESSURE: 68 MMHG

## 2024-08-05 DIAGNOSIS — G47.419 PRIMARY NARCOLEPSY WITHOUT CATAPLEXY: ICD-10-CM

## 2024-08-05 DIAGNOSIS — D64.9 LOW HEMOGLOBIN: ICD-10-CM

## 2024-08-05 DIAGNOSIS — F11.20 CHRONIC NARCOTIC DEPENDENCE (H): ICD-10-CM

## 2024-08-05 DIAGNOSIS — M79.643 CHRONIC HAND PAIN, UNSPECIFIED LATERALITY: ICD-10-CM

## 2024-08-05 DIAGNOSIS — G89.29 CHRONIC HAND PAIN, UNSPECIFIED LATERALITY: ICD-10-CM

## 2024-08-05 DIAGNOSIS — F11.20 CONTINUOUS OPIOID DEPENDENCE (H): Primary | ICD-10-CM

## 2024-08-05 LAB
BASOPHILS # BLD AUTO: 0 10E3/UL (ref 0–0.2)
BASOPHILS NFR BLD AUTO: 1 %
CRP SERPL-MCNC: <3 MG/L
EOSINOPHIL # BLD AUTO: 0.4 10E3/UL (ref 0–0.7)
EOSINOPHIL NFR BLD AUTO: 6 %
ERYTHROCYTE [DISTWIDTH] IN BLOOD BY AUTOMATED COUNT: 12.7 % (ref 10–15)
FERRITIN SERPL-MCNC: 93 NG/ML (ref 31–409)
HCT VFR BLD AUTO: 37.9 % (ref 40–53)
HGB BLD-MCNC: 13 G/DL (ref 13.3–17.7)
IMM GRANULOCYTES # BLD: 0 10E3/UL
IMM GRANULOCYTES NFR BLD: 0 %
IRON BINDING CAPACITY (ROCHE): 251 UG/DL (ref 240–430)
IRON SATN MFR SERPL: 39 % (ref 15–46)
IRON SERPL-MCNC: 99 UG/DL (ref 61–157)
LYMPHOCYTES # BLD AUTO: 2 10E3/UL (ref 0.8–5.3)
LYMPHOCYTES NFR BLD AUTO: 34 %
MCH RBC QN AUTO: 31 PG (ref 26.5–33)
MCHC RBC AUTO-ENTMCNC: 34.3 G/DL (ref 31.5–36.5)
MCV RBC AUTO: 90 FL (ref 78–100)
MONOCYTES # BLD AUTO: 0.6 10E3/UL (ref 0–1.3)
MONOCYTES NFR BLD AUTO: 9 %
NEUTROPHILS # BLD AUTO: 3 10E3/UL (ref 1.6–8.3)
NEUTROPHILS NFR BLD AUTO: 50 %
NRBC # BLD AUTO: 0 10E3/UL
NRBC BLD AUTO-RTO: 0 /100
PLATELET # BLD AUTO: 181 10E3/UL (ref 150–450)
RBC # BLD AUTO: 4.2 10E6/UL (ref 4.4–5.9)
RETICS # AUTO: 0.05 10E6/UL (ref 0.03–0.1)
RETICS/RBC NFR AUTO: 1.1 % (ref 0.5–2)
WBC # BLD AUTO: 5.9 10E3/UL (ref 4–11)

## 2024-08-05 PROCEDURE — G2211 COMPLEX E/M VISIT ADD ON: HCPCS | Performed by: STUDENT IN AN ORGANIZED HEALTH CARE EDUCATION/TRAINING PROGRAM

## 2024-08-05 PROCEDURE — 85049 AUTOMATED PLATELET COUNT: CPT | Mod: ZL | Performed by: STUDENT IN AN ORGANIZED HEALTH CARE EDUCATION/TRAINING PROGRAM

## 2024-08-05 PROCEDURE — 99214 OFFICE O/P EST MOD 30 MIN: CPT | Performed by: STUDENT IN AN ORGANIZED HEALTH CARE EDUCATION/TRAINING PROGRAM

## 2024-08-05 PROCEDURE — 82728 ASSAY OF FERRITIN: CPT | Mod: ZL | Performed by: STUDENT IN AN ORGANIZED HEALTH CARE EDUCATION/TRAINING PROGRAM

## 2024-08-05 PROCEDURE — 86431 RHEUMATOID FACTOR QUANT: CPT | Mod: ZL | Performed by: STUDENT IN AN ORGANIZED HEALTH CARE EDUCATION/TRAINING PROGRAM

## 2024-08-05 PROCEDURE — 85045 AUTOMATED RETICULOCYTE COUNT: CPT | Mod: ZL | Performed by: STUDENT IN AN ORGANIZED HEALTH CARE EDUCATION/TRAINING PROGRAM

## 2024-08-05 PROCEDURE — 86200 CCP ANTIBODY: CPT | Mod: ZL | Performed by: STUDENT IN AN ORGANIZED HEALTH CARE EDUCATION/TRAINING PROGRAM

## 2024-08-05 PROCEDURE — G0463 HOSPITAL OUTPT CLINIC VISIT: HCPCS

## 2024-08-05 PROCEDURE — 83540 ASSAY OF IRON: CPT | Mod: ZL | Performed by: STUDENT IN AN ORGANIZED HEALTH CARE EDUCATION/TRAINING PROGRAM

## 2024-08-05 PROCEDURE — 36415 COLL VENOUS BLD VENIPUNCTURE: CPT | Mod: ZL | Performed by: STUDENT IN AN ORGANIZED HEALTH CARE EDUCATION/TRAINING PROGRAM

## 2024-08-05 PROCEDURE — 83550 IRON BINDING TEST: CPT | Mod: ZL | Performed by: STUDENT IN AN ORGANIZED HEALTH CARE EDUCATION/TRAINING PROGRAM

## 2024-08-05 PROCEDURE — 86140 C-REACTIVE PROTEIN: CPT | Mod: ZL | Performed by: STUDENT IN AN ORGANIZED HEALTH CARE EDUCATION/TRAINING PROGRAM

## 2024-08-05 RX ORDER — METHYLPHENIDATE HYDROCHLORIDE 20 MG/1
20 TABLET ORAL DAILY
Qty: 30 TABLET | Refills: 0 | Status: SHIPPED | OUTPATIENT
Start: 2024-10-04 | End: 2024-11-03

## 2024-08-05 RX ORDER — OXYCODONE AND ACETAMINOPHEN 5; 325 MG/1; MG/1
1 TABLET ORAL 2 TIMES DAILY PRN
Qty: 60 TABLET | Refills: 0 | Status: SHIPPED | OUTPATIENT
Start: 2024-10-03 | End: 2024-11-02

## 2024-08-05 RX ORDER — OXYCODONE AND ACETAMINOPHEN 5; 325 MG/1; MG/1
1 TABLET ORAL 2 TIMES DAILY PRN
Qty: 60 TABLET | Refills: 0 | Status: SHIPPED | OUTPATIENT
Start: 2024-08-05 | End: 2024-09-04

## 2024-08-05 RX ORDER — DEXTROAMPHETAMINE SACCHARATE, AMPHETAMINE ASPARTATE MONOHYDRATE, DEXTROAMPHETAMINE SULFATE AND AMPHETAMINE SULFATE 5; 5; 5; 5 MG/1; MG/1; MG/1; MG/1
20 CAPSULE, EXTENDED RELEASE ORAL DAILY
Qty: 30 CAPSULE | Refills: 0 | Status: SHIPPED | OUTPATIENT
Start: 2024-09-04 | End: 2024-10-04

## 2024-08-05 RX ORDER — OXYCODONE AND ACETAMINOPHEN 5; 325 MG/1; MG/1
1 TABLET ORAL 2 TIMES DAILY PRN
Qty: 60 TABLET | Refills: 0 | Status: SHIPPED | OUTPATIENT
Start: 2024-09-04 | End: 2024-10-04

## 2024-08-05 RX ORDER — DEXTROAMPHETAMINE SACCHARATE, AMPHETAMINE ASPARTATE MONOHYDRATE, DEXTROAMPHETAMINE SULFATE AND AMPHETAMINE SULFATE 5; 5; 5; 5 MG/1; MG/1; MG/1; MG/1
20 CAPSULE, EXTENDED RELEASE ORAL DAILY
Qty: 30 CAPSULE | Refills: 0 | Status: SHIPPED | OUTPATIENT
Start: 2024-10-04 | End: 2024-11-03

## 2024-08-05 RX ORDER — METHYLPHENIDATE HYDROCHLORIDE 20 MG/1
20 TABLET ORAL DAILY
Qty: 30 TABLET | Refills: 0 | Status: SHIPPED | OUTPATIENT
Start: 2024-09-04 | End: 2024-10-04

## 2024-08-05 RX ORDER — DEXTROAMPHETAMINE SACCHARATE, AMPHETAMINE ASPARTATE MONOHYDRATE, DEXTROAMPHETAMINE SULFATE AND AMPHETAMINE SULFATE 5; 5; 5; 5 MG/1; MG/1; MG/1; MG/1
20 CAPSULE, EXTENDED RELEASE ORAL DAILY
Qty: 30 CAPSULE | Refills: 0 | Status: SHIPPED | OUTPATIENT
Start: 2024-08-05 | End: 2024-09-04

## 2024-08-05 RX ORDER — METHYLPHENIDATE HYDROCHLORIDE 20 MG/1
20 TABLET ORAL DAILY
Qty: 30 TABLET | Refills: 0 | Status: SHIPPED | OUTPATIENT
Start: 2024-08-05 | End: 2024-09-04

## 2024-08-05 ASSESSMENT — PAIN SCALES - GENERAL: PAINLEVEL: MODERATE PAIN (5)

## 2024-08-05 NOTE — PROGRESS NOTES
Assessment & Plan   Problem List Items Addressed This Visit          Other    Chronic narcotic dependence (H)    Relevant Medications    oxyCODONE-acetaminophen (PERCOCET) 5-325 MG tablet (Start on 10/3/2024)    oxyCODONE-acetaminophen (PERCOCET) 5-325 MG tablet (Start on 9/4/2024)    oxyCODONE-acetaminophen (PERCOCET) 5-325 MG tablet    Primary narcolepsy without cataplexy    Relevant Medications    amphetamine-dextroamphetamine (ADDERALL XR) 20 MG 24 hr capsule    amphetamine-dextroamphetamine (ADDERALL XR) 20 MG 24 hr capsule (Start on 9/4/2024)    amphetamine-dextroamphetamine (ADDERALL XR) 20 MG 24 hr capsule (Start on 10/4/2024)    methylphenidate (RITALIN) 20 MG tablet    methylphenidate (RITALIN) 20 MG tablet (Start on 9/4/2024)    methylphenidate (RITALIN) 20 MG tablet (Start on 10/4/2024)    Other Relevant Orders    Adult Sleep Eval & Management Referral    Continuous opioid dependence (H) - Primary    Relevant Medications    oxyCODONE-acetaminophen (PERCOCET) 5-325 MG tablet (Start on 9/4/2024)    oxyCODONE-acetaminophen (PERCOCET) 5-325 MG tablet     Other Visit Diagnoses       Low hemoglobin        Relevant Orders    Iron & Iron Binding Capacity (Completed)    Ferritin (Completed)    Lab Blood Morphology Pathologist Review    Chronic hand pain, unspecified laterality        Relevant Orders    Rheumatoid factor    Cyclic Citrullinated Peptide Antibody IgG    CRP inflammation (Completed)           Reviewed  and appropriate  Refilled percocet. Referral to sleep medicine. We disucssed that given his age we will need to wean down on stimulants likely given the risk vs benefits  Decrease adderall from BID to daily. Refilled ritalin BID for now  Labs screening for RA  Labs to work up persistent low hgb.. could be anemia of chronic disease. No signs of symptoms of blood loss    The longitudinal plan of care for the diagnosis(es)/condition(s) as documented were addressed during this visit. Due to the added  "complexity in care, I will continue to support Joe in the subsequent management and with ongoing continuity of care.       BMI  Estimated body mass index is 31.49 kg/m  as calculated from the following:    Height as of this encounter: 1.778 m (5' 10\").    Weight as of this encounter: 99.6 kg (219 lb 8 oz).           No follow-ups on file.      Subjective   Joe is a 78 year old, presenting for the following health issues:  Diabetes (Check up with medication management) and Musculoskeletal Problem (Bilateral hand pain with swelling, worse in the morning)    Musculoskeletal Problem    History of Present Illness       Back Pain:  He presents for follow up of back pain. Patient's back pain is a chronic problem.  Location of back pain:  Left lower back, left middle of back, left side of neck and left shoulder  Description of back pain: dull ache, gnawing and shooting  Back pain spreads: left shoulder and left side of neck    Since patient first noticed back pain, pain is: always present, but gets better and worse  Does back pain interfere with his job:  Not applicable       Diabetes:   He presents for follow up of diabetes.  He is checking home blood glucose a few times a month.   He checks blood glucose at bedtime.  Blood glucose is sometimes over 200 and never under 70. He is aware of hypoglycemia symptoms including shakiness.    He has no concerns regarding his diabetes at this time.  He is having numbness in feet, burning in feet, excessive thirst and blurry vision.            Hyperlipidemia:  He presents for follow up of hyperlipidemia.   He is taking medication to lower cholesterol. He is not having myalgia or other side effects to statin medications.    Hypertension: He presents for follow up of hypertension.  He does not check blood pressure  regularly outside of the clinic. Outside blood pressures have been over 140/90. He does not follow a low salt diet.     He eats 0-1 servings of fruits and vegetables " "daily.He consumes 2 sweetened beverage(s) daily.He exercises with enough effort to increase his heart rate 10 to 19 minutes per day.  He exercises with enough effort to increase his heart rate 4 days per week. He is missing 1 dose(s) of medications per week.       - med refill- ritalin, adderall, and percocet  - taking adderall BID and ritalin BID for narcolepsy and adhd. Has been chronic med  - percocet for chronic pain  - rheumatoid arthritis -- wondering if that is the cause of pain in hands                 Review of Systems  Constitutional, HEENT, cardiovascular, pulmonary, gi and gu systems are negative, except as otherwise noted.      Objective    /68 (BP Location: Right arm, Patient Position: Sitting, Cuff Size: Adult Large)   Pulse 58   Temp 97  F (36.1  C) (Tympanic)   Resp 16   Ht 1.778 m (5' 10\")   Wt 99.6 kg (219 lb 8 oz)   SpO2 98%   BMI 31.49 kg/m    Body mass index is 31.49 kg/m .  Physical Exam   GENERAL: alert and no distress  RESP: lungs clear to auscultation - no rales, rhonchi or wheezes  CV: regular rate and rhythm, normal S1 S2, no S3 or S4, no murmur, click or rub, no peripheral edema   MS: pain with flexion in hands at MCP   PSYCH: mentation appears normal and affect flat    Results for orders placed or performed in visit on 08/05/24 (from the past 24 hour(s))   CRP inflammation   Result Value Ref Range    CRP Inflammation <3.00 <5.00 mg/L   Iron & Iron Binding Capacity   Result Value Ref Range    Iron 99 61 - 157 ug/dL    Iron Binding Capacity 251 240 - 430 ug/dL    Iron Sat Index 39 15 - 46 %   Ferritin   Result Value Ref Range    Ferritin 93 31 - 409 ng/mL   Lab Blood Morphology Pathologist Review    Narrative    The following orders were created for panel order Lab Blood Morphology Pathologist Review.  Procedure                               Abnormality         Status                     ---------                               -----------         ------                   "   Bld morphology pathology...[[id number]]                                                 CBC with platelets and d...[[id number]]  Abnormal            Final result               Reticulocyte count[[id number]]           Normal              Final result               Morphology Tracking[[id number]]                              Final result                 Please view results for these tests on the individual orders.   CBC with platelets and differential   Result Value Ref Range    WBC Count 5.9 4.0 - 11.0 10e3/uL    RBC Count 4.20 (L) 4.40 - 5.90 10e6/uL    Hemoglobin 13.0 (L) 13.3 - 17.7 g/dL    Hematocrit 37.9 (L) 40.0 - 53.0 %    MCV 90 78 - 100 fL    MCH 31.0 26.5 - 33.0 pg    MCHC 34.3 31.5 - 36.5 g/dL    RDW 12.7 10.0 - 15.0 %    Platelet Count 181 150 - 450 10e3/uL    % Neutrophils 50 %    % Lymphocytes 34 %    % Monocytes 9 %    % Eosinophils 6 %    % Basophils 1 %    % Immature Granulocytes 0 %    NRBCs per 100 WBC 0 <1 /100    Absolute Neutrophils 3.0 1.6 - 8.3 10e3/uL    Absolute Lymphocytes 2.0 0.8 - 5.3 10e3/uL    Absolute Monocytes 0.6 0.0 - 1.3 10e3/uL    Absolute Eosinophils 0.4 0.0 - 0.7 10e3/uL    Absolute Basophils 0.0 0.0 - 0.2 10e3/uL    Absolute Immature Granulocytes 0.0 <=0.4 10e3/uL    Absolute NRBCs 0.0 10e3/uL   Reticulocyte count   Result Value Ref Range    % Reticulocyte 1.1 0.5 - 2.0 %    Absolute Reticulocyte 0.047 0.025 - 0.095 10e6/uL           Signed Electronically by: Juan Otero MD

## 2024-08-05 NOTE — NURSING NOTE
"Medication Reconciliation: Complete    Hue Rubio LPN  8/5/2024 2:42 PM  Chief Complaint   Patient presents with    Diabetes     Check up with medication management    Musculoskeletal Problem     Bilateral hand pain with swelling, worse in the morning       Initial /68 (BP Location: Right arm, Patient Position: Sitting, Cuff Size: Adult Large)   Pulse 58   Temp 97  F (36.1  C) (Tympanic)   Resp 16   Ht 1.778 m (5' 10\")   Wt 99.6 kg (219 lb 8 oz)   SpO2 98%   BMI 31.49 kg/m   Estimated body mass index is 31.49 kg/m  as calculated from the following:    Height as of this encounter: 1.778 m (5' 10\").    Weight as of this encounter: 99.6 kg (219 lb 8 oz).  Medication Review: complete    The next two questions are to help us understand your food security.  If you are feeling you need any assistance in this area, we have resources available to support you today.          2/29/2024   SDOH- Food Insecurity   Within the past 12 months, did you worry that your food would run out before you got money to buy more? N   Within the past 12 months, did the food you bought just not last and you didn t have money to get more? N            Health Care Directive:  Patient does not have a Health Care Directive or Living Will: Discussed advance care planning with patient; however, patient declined at this time.    Hue Rubio LPN      "

## 2024-08-06 LAB — RHEUMATOID FACT SERPL-ACNC: <10 IU/ML

## 2024-08-07 LAB
CCP AB SER IA-ACNC: 0.9 U/ML
PATH REPORT.FINAL DX SPEC: NORMAL

## 2024-08-08 NOTE — RESULT ENCOUNTER NOTE
Please call with results: hemoglobin low but stable. Looks like anemia of chronic disease (nothing to change as far as meds). All other labs normal, negative for rheumatoid arthritis

## 2024-08-19 DIAGNOSIS — K21.9 GASTROESOPHAGEAL REFLUX DISEASE WITHOUT ESOPHAGITIS: ICD-10-CM

## 2024-08-20 DIAGNOSIS — E03.9 HYPOTHYROIDISM, UNSPECIFIED TYPE: Primary | ICD-10-CM

## 2024-08-20 DIAGNOSIS — E78.5 HYPERLIPIDEMIA LDL GOAL <70: ICD-10-CM

## 2024-08-20 DIAGNOSIS — F34.1 DYSTHYMIA: ICD-10-CM

## 2024-08-20 DIAGNOSIS — Z00.00 HEALTH CARE MAINTENANCE: ICD-10-CM

## 2024-08-21 RX ORDER — LEVOTHYROXINE SODIUM 200 UG/1
TABLET ORAL
Qty: 90 TABLET | Refills: 0 | Status: SHIPPED | OUTPATIENT
Start: 2024-08-21

## 2024-08-21 RX ORDER — ATORVASTATIN CALCIUM 40 MG/1
40 TABLET, FILM COATED ORAL AT BEDTIME
Qty: 90 TABLET | Refills: 0 | Status: SHIPPED | OUTPATIENT
Start: 2024-08-21

## 2024-08-21 RX ORDER — FOLIC ACID 1 MG/1
1000 TABLET ORAL DAILY
Qty: 90 TABLET | Refills: 0 | Status: SHIPPED | OUTPATIENT
Start: 2024-08-21

## 2024-08-21 RX ORDER — FLUOXETINE 40 MG/1
40 CAPSULE ORAL EVERY MORNING
Qty: 90 CAPSULE | Refills: 0 | Status: SHIPPED | OUTPATIENT
Start: 2024-08-21

## 2024-08-21 RX ORDER — MAGNESIUM OXIDE 400 MG/1
400 TABLET ORAL
Qty: 90 TABLET | Refills: 0 | Status: SHIPPED | OUTPATIENT
Start: 2024-08-21

## 2024-08-21 NOTE — TELEPHONE ENCOUNTER
[id number] sent Rx request for the following:      Requested Prescriptions   Pending Prescriptions Disp Refills    magnesium oxide (MAG-OX) 400 MG tablet [Pharmacy Med Name: MAGNESIUM OXIDE 400MG TABLET] 90 tablet 2     Sig: TAKE 1 TABLET BY MOUTH DAILY WITH FOOD   Historical    There is no refill protocol information for this order       atorvastatin (LIPITOR) 40 MG tablet [Pharmacy Med Name: ATORVASTATIN 40MG TABLET] 90 tablet 0     Sig: TAKE 1 TABLET BY MOUTH AT BEDTIME   Historical      FLUoxetine (PROZAC) 40 MG capsule [Pharmacy Med Name: FLUOXETINE 40MG CAPSULE] 90 capsule 0     Sig: TAKE 1 CAPSULE (40 MG) BY MOUTH EVERY MORNING.   Historical    SSRIs Protocol Failed - 8/21/2024  2:04 PM        Failed - Medication indicated for associated diagnosis     Medication is associated with one or more of the following diagnoses:              Anxiety             Bipolar  Depression  Obsessive-compulsive disorder             Panic disorder  Postmenopausal flushing             Premenstrual dysphoric disorder             Social phobia   Adjustment disorder with depressed mood   Mood disorder       levothyroxine (SYNTHROID/LEVOTHROID) 200 MCG tablet [Pharmacy Med Name: LEVOTHYROXINE 200MCG TABLET] 90 tablet 0     Sig: TAKE 1 TABLET (200 MCG) BY MOUTH BEFORE BREAKFAST.   Historical    Thyroid Protocol Failed - 8/21/2024  2:04 PM        Failed - Medication indicated for associated diagnosis     Medication is associated with one or more of the following diagnoses:  Hypothyroidism  Thyroid stimulating hormone suppression therapy  Thyroid cancer  Acquired atrophy of thyroid       folic acid (FOLVITE) 1 MG tablet [Pharmacy Med Name: FOLIC ACID 1MG TABLET] 90 tablet 0     Sig: TAKE 1 TAB BY MOUTH ONCE DAILY   Historical    Vitamin Supplements Protocol Failed - 8/21/2024  2:04 PM        Failed - Medication indicated for associated diagnosis     The medication is prescribed for one or more of the  following conditions:     Vitamin deficiency     Last Office Visit:              8/5/24   Future Office visit:             Future Appointments 8/21/2024 - 2/17/2025        Date Visit Type Length Department Provider     10/21/2024 11:15 AM RETURN 30 min  CARDIOLOGY Jackeline Chanel APRN CNP    Location Instructions:     [facility] is located [address] of [address] and [address] of [facility] [address].              11/4/2024  1:20 PM OFFICE VISIT 20 min  FAMILY PRACTICE Juan Nava MD    Location Instructions:     [facility] is located [address] of [address] and [address]                   Unable to complete prescription refill per RN Medication Refill Policy. Hue Lagunas RN .............. 8/21/2024  2:12 PM

## 2024-08-22 RX ORDER — PANTOPRAZOLE SODIUM 20 MG/1
20 TABLET, DELAYED RELEASE ORAL DAILY
Qty: 90 TABLET | Refills: 0 | Status: SHIPPED | OUTPATIENT
Start: 2024-08-22

## 2024-08-22 NOTE — TELEPHONE ENCOUNTER
Last Office Visit:              8/5/24 GO   Future Office visit:           11/4/24 KWA    Requested Prescriptions   Pending Prescriptions Disp Refills    pantoprazole (PROTONIX) 20 MG EC tablet [Pharmacy Med Name: PANTOPRAZOLE 20MG DR TABLET] 90 tablet 0     Sig: TAKE 1 TABLET (20 MG) BY MOUTH DAILY       PPI Protocol Passed - 8/19/2024  1:29 AM        Passed - Medication is active on med list        Passed - Medication indicated for associated diagnosis     The medication is prescribed for one or more of the following conditions:     Erosive esophagitis    Gastritis   Gastric hypersecretion   Gastric ulcer   Gastroesophageal reflux disease   Helicobacter pylori gastrointestinal tract infection   Ulcer of duodenum   Drug-induced peptic ulcer   Esophageal stricture   Gastrointestinal hemorrhage   Indigestion   Stress ulcer   Zollinger-Walls syndrome   Rodriguez s esophagus   Laryngopharyngeal reflux   Epigastric Pain   Morbid Obesity   Cough          Passed - Recent (12 mo) or future (90 days) visit within the authorizing provider's specialty     The patient must have completed an in-person or virtual visit within the past 12 months or has a future visit scheduled within the next 90 days with the authorizing provider s specialty.  Urgent care and e-visits do not quality as an office visit for this protocol.          Passed - Patient is age 18 or older           Last Prescription Date:   5/24/24  Last Fill Qty/Refills:         90  /  0

## 2024-10-08 DIAGNOSIS — F11.20 CHRONIC NARCOTIC DEPENDENCE (H): ICD-10-CM

## 2024-10-08 DIAGNOSIS — F11.20 CONTINUOUS OPIOID DEPENDENCE (H): ICD-10-CM

## 2024-10-11 RX ORDER — OXYCODONE AND ACETAMINOPHEN 5; 325 MG/1; MG/1
TABLET ORAL
Qty: 60 TABLET | Refills: 0 | OUTPATIENT
Start: 2024-10-11

## 2024-10-11 NOTE — TELEPHONE ENCOUNTER
[id number] of [place] sent Rx request for the following:      Redundant refill request refused: Too soon:  oxyCODONE-acetaminophen (PERCOCET) 5-325 MG tablet 60 tablet 0 10/3/2024 11/2/2024 No   Sig - Route: Take 1 tablet by mouth 2 times daily as needed for pain - Oral   Sent to pharmacy as: oxyCODONE-Acetaminophen 5-325 MG Oral Tablet (PERCOCET)   Class: E-Prescribe   Earliest Fill Date: 10/3/2024   Order: [id number]   E-Prescribing Status: Receipt confirmed by pharmacy (8/5/2024  3:05 PM CDT)     [id number] - [place], [place] - [address] RN .............. 10/11/2024  1:31 PM

## 2024-10-22 ENCOUNTER — TELEPHONE (OUTPATIENT)
Dept: FAMILY MEDICINE | Facility: OTHER | Age: 79
End: 2024-10-22
Payer: COMMERCIAL

## 2024-10-22 DIAGNOSIS — F11.20 CHRONIC NARCOTIC DEPENDENCE (H): ICD-10-CM

## 2024-10-22 NOTE — TELEPHONE ENCOUNTER
Left message that he will need an office visit per clinic policy for refills of those medications.   Eleni Conrad LPN .............10/22/2024     4:42 PM

## 2024-10-22 NOTE — TELEPHONE ENCOUNTER
Patient would like a call back to discuss why his medications are always denied. States that he does not get his percocet and adderall will not be filled. Please call to discuss      Joie Gurrola on 10/22/2024 at 11:54 AM

## 2024-10-23 RX ORDER — OXYCODONE AND ACETAMINOPHEN 5; 325 MG/1; MG/1
1 TABLET ORAL 2 TIMES DAILY PRN
Qty: 60 TABLET | Refills: 0 | OUTPATIENT
Start: 2024-10-23

## 2024-10-23 NOTE — TELEPHONE ENCOUNTER
oxyCODONE-acetaminophen (PERCOCET) 5-325 MG tablet        Last Written Prescription Date:  10/3/24  Last Fill Quantity: 60,   # refills: 0  Last Office Visit: 8/5/24  Future Office visit:    Next 5 appointments (look out 90 days)      Nov 04, 2024 1:20 PM  (Arrive by 1:05 PM)  Provider Visit with Juan Otero MD  [facility] ([facility] ) [address]  [place] [id number] [phone]             Routing refill request to provider for review/approval because:  Drug not on the [facility], [facility] or [facility] refill protocol or controlled substance. Unable to complete prescription refill per RNMedication Refill Policy.................... Izabel Kapadia RN ....................  10/23/2024   1:34 PM

## 2024-10-28 DIAGNOSIS — F11.20 CHRONIC NARCOTIC DEPENDENCE (H): ICD-10-CM

## 2024-10-28 RX ORDER — OXYCODONE AND ACETAMINOPHEN 5; 325 MG/1; MG/1
1 TABLET ORAL 2 TIMES DAILY PRN
Qty: 60 TABLET | Refills: 0 | Status: CANCELLED | OUTPATIENT
Start: 2024-10-28

## 2024-10-30 NOTE — TELEPHONE ENCOUNTER
Called [facility] and they have Rx for Percocet to be filled on 10/3/24 and they will get that ready for patient.  Aarti Contreras RN on 10/30/2024 at 9:09 AM

## 2024-11-06 DIAGNOSIS — F11.20 CHRONIC NARCOTIC DEPENDENCE (H): ICD-10-CM

## 2024-11-06 NOTE — TELEPHONE ENCOUNTER
Called and spoke with [facility] and they just filled patients Percocet on 10/30 for a month.  They will discontinue request.  Aarti Contreras RN on 11/6/2024 at 9:10 AM

## 2024-11-13 RX ORDER — AMLODIPINE BESYLATE 5 MG/1
5 TABLET ORAL 2 TIMES DAILY
Qty: 180 TABLET | Refills: 3 | OUTPATIENT
Start: 2024-11-13

## 2024-11-13 NOTE — TELEPHONE ENCOUNTER
[id number] sent Rx request for the following:      Requested Prescriptions   Pending Prescriptions Disp Refills    amLODIPine (NORVASC) 5 MG tablet [Pharmacy Med Name: AMLODIPINE 5MG TABLET] 180 tablet 3     Sig: TAKE 1 TABLET BY MOUTH TWICE DAILY       Calcium Channel Blockers Protocol  Passed - 11/13/2024  9:50 AM        Passed - Most recent blood pressure under 140/90 in past 12 months     BP Readings from Last 3 Encounters:   08/05/24 130/68   07/22/24 (!) 150/62   06/11/24 (!) 160/72       No data recorded            Passed - Medication is active on med list        Passed - GFR is on file in the past 12 months and most recent GFR is normal        Passed - Recent (12 mo) or future (90 days) visit within the authorizing provider's specialty     The patient must have completed an in-person or virtual visit within the past 12 months or has a future visit scheduled within the next 90 days with the authorizing provider s specialty.  Urgent care and e-visits do not quality as an office visit for this protocol.          Passed - Patient is age 18 or older             Last Prescription Date:   2/29/24 discontinued  Last Fill Qty/Refills:            Last Office Visit:              6/11/24 Silvia   Future Office visit:           12/2/24  Medication is being denied due to: Discontinued   Kimberli Lopez RN ....................  11/13/2024   9:53 AM

## 2024-11-20 DIAGNOSIS — F34.1 DYSTHYMIA: ICD-10-CM

## 2024-11-20 DIAGNOSIS — E03.9 HYPOTHYROIDISM, UNSPECIFIED TYPE: ICD-10-CM

## 2024-11-20 DIAGNOSIS — K21.9 GASTROESOPHAGEAL REFLUX DISEASE WITHOUT ESOPHAGITIS: ICD-10-CM

## 2024-11-20 RX ORDER — FLUOXETINE 40 MG/1
40 CAPSULE ORAL EVERY MORNING
Qty: 90 CAPSULE | Refills: 0 | Status: SHIPPED | OUTPATIENT
Start: 2024-11-20

## 2024-11-20 RX ORDER — LEVOTHYROXINE SODIUM 200 UG/1
TABLET ORAL
Qty: 90 TABLET | Refills: 0 | Status: SHIPPED | OUTPATIENT
Start: 2024-11-20

## 2024-11-20 RX ORDER — PANTOPRAZOLE SODIUM 20 MG/1
20 TABLET, DELAYED RELEASE ORAL DAILY
Qty: 90 TABLET | Refills: 0 | Status: SHIPPED | OUTPATIENT
Start: 2024-11-20

## 2024-11-20 NOTE — TELEPHONE ENCOUNTER
[id number] sent Rx request for the following:      Requested Prescriptions   Pending Prescriptions Disp Refills    FLUoxetine (PROZAC) 40 MG capsule [Pharmacy Med Name: FLUOXETINE 40MG CAPSULE] 90 capsule 0     Sig: TAKE 1 CAPSULE (40 MG) BY MOUTH EVERY MORNING.   Last Prescription Date:   8/21/24  Last Fill Qty/Refills:         90, R-0        levothyroxine (SYNTHROID/LEVOTHROID) 200 MCG tablet [Pharmacy Med Name: LEVOTHYROXINE 200MCG TABLET] 90 tablet 0     Sig: TAKE 1 TABLET (200 MCG) BY MOUTH BEFORE BREAKFAST.   Last Prescription Date:   8/21/24  Last Fill Qty/Refills:         90, R-0        pantoprazole (PROTONIX) 20 MG EC tablet [Pharmacy Med Name: PANTOPRAZOLE 20MG DR TABLET] 90 tablet 0     Sig: TAKE 1 TABLET (20 MG) BY MOUTH DAILY   Last Prescription Date:   8/22/24  Last Fill Qty/Refills:         90, R-0      Last Office Visit:                8/5/24 2/29/24 (hypothyroidism discussed)  Dysthmia and GERD never discussed    Future Office visit:           12/2/24    Unable to complete prescription refill per RN Medication Refill Policy. Hue Lagunas RN .............. 11/20/2024  9:57 AM

## 2024-11-24 DIAGNOSIS — K59.1 FUNCTIONAL DIARRHEA: ICD-10-CM

## 2024-11-26 RX ORDER — LOPERAMIDE HYDROCHLORIDE 2 MG/1
CAPSULE ORAL
Qty: 270 CAPSULE | Refills: 0 | Status: SHIPPED | OUTPATIENT
Start: 2024-11-26

## 2024-11-26 NOTE — TELEPHONE ENCOUNTER
[id number] sent Rx request for the following:      Requested Prescriptions   Pending Prescriptions Disp Refills    loperamide (IMODIUM) 2 MG capsule [Pharmacy Med Name: LOPERAMIDE 2MG CAPSULE] 270 capsule 0     Sig: TAKE 2 CAPSULES IN THE MORNING AND 1 CAPSULE IN THE EVENING       There is no refill protocol information for this order          Last Prescription Date:   7/18/24  Last Fill Qty/Refills:         270, R-0    Last Office Visit:              8/5/24   Future Office visit:             Next 5 appointments (look out 90 days)      Dec 02, 2024 11:20 AM  (Arrive by 11:05 AM)  Provider Visit with Juan Otero MD  [facility] ([facility]) [address]  [place] [id number] [phone]           Unable to complete prescription refill per RN Medication Refill Policy.     Rene Gomez RN on 11/26/2024 at 8:28 AM

## 2024-12-05 ENCOUNTER — OFFICE VISIT (OUTPATIENT)
Dept: FAMILY MEDICINE | Facility: OTHER | Age: 79
End: 2024-12-05
Attending: NURSE PRACTITIONER
Payer: MEDICARE

## 2024-12-05 VITALS
HEART RATE: 76 BPM | WEIGHT: 221 LBS | SYSTOLIC BLOOD PRESSURE: 146 MMHG | BODY MASS INDEX: 31.71 KG/M2 | OXYGEN SATURATION: 97 % | DIASTOLIC BLOOD PRESSURE: 82 MMHG | RESPIRATION RATE: 18 BRPM

## 2024-12-05 DIAGNOSIS — G47.419 PRIMARY NARCOLEPSY WITHOUT CATAPLEXY: ICD-10-CM

## 2024-12-05 DIAGNOSIS — E11.51 TYPE 2 DIABETES MELLITUS WITH PERIPHERAL VASCULAR DISEASE (H): ICD-10-CM

## 2024-12-05 DIAGNOSIS — E11.69 TYPE 2 DIABETES MELLITUS WITH OTHER SPECIFIED COMPLICATION, WITH LONG-TERM CURRENT USE OF INSULIN (H): Primary | ICD-10-CM

## 2024-12-05 DIAGNOSIS — Z79.4 TYPE 2 DIABETES MELLITUS WITH OTHER SPECIFIED COMPLICATION, WITH LONG-TERM CURRENT USE OF INSULIN (H): Primary | ICD-10-CM

## 2024-12-05 DIAGNOSIS — F90.2 ATTENTION DEFICIT HYPERACTIVITY DISORDER (ADHD), COMBINED TYPE: ICD-10-CM

## 2024-12-05 DIAGNOSIS — I10 PRIMARY HYPERTENSION: ICD-10-CM

## 2024-12-05 DIAGNOSIS — E03.9 HYPOTHYROIDISM, UNSPECIFIED TYPE: ICD-10-CM

## 2024-12-05 DIAGNOSIS — F11.20 CONTINUOUS OPIOID DEPENDENCE (H): ICD-10-CM

## 2024-12-05 DIAGNOSIS — F11.20 CHRONIC NARCOTIC DEPENDENCE (H): ICD-10-CM

## 2024-12-05 LAB
ALBUMIN SERPL BCG-MCNC: 4.5 G/DL (ref 3.5–5.2)
ALP SERPL-CCNC: 89 U/L (ref 40–150)
ALT SERPL W P-5'-P-CCNC: 20 U/L (ref 0–70)
ANION GAP SERPL CALCULATED.3IONS-SCNC: 8 MMOL/L (ref 7–15)
AST SERPL W P-5'-P-CCNC: 20 U/L (ref 0–45)
BILIRUB SERPL-MCNC: 0.8 MG/DL
BUN SERPL-MCNC: 19.7 MG/DL (ref 8–23)
CALCIUM SERPL-MCNC: 9.4 MG/DL (ref 8.8–10.4)
CHLORIDE SERPL-SCNC: 103 MMOL/L (ref 98–107)
CREAT SERPL-MCNC: 1.46 MG/DL (ref 0.67–1.17)
CREAT UR-MCNC: 123.6 MG/DL
EGFRCR SERPLBLD CKD-EPI 2021: 49 ML/MIN/1.73M2
EST. AVERAGE GLUCOSE BLD GHB EST-MCNC: 148 MG/DL
GLUCOSE SERPL-MCNC: 140 MG/DL (ref 70–99)
HBA1C MFR BLD: 6.8 %
HCO3 SERPL-SCNC: 29 MMOL/L (ref 22–29)
MICROALBUMIN UR-MCNC: 39.3 MG/L
MICROALBUMIN/CREAT UR: 31.8 MG/G CR (ref 0–17)
POTASSIUM SERPL-SCNC: 4.2 MMOL/L (ref 3.4–5.3)
PROT SERPL-MCNC: 7.4 G/DL (ref 6.4–8.3)
SODIUM SERPL-SCNC: 140 MMOL/L (ref 135–145)
TSH SERPL DL<=0.005 MIU/L-ACNC: 1.3 UIU/ML (ref 0.3–4.2)

## 2024-12-05 PROCEDURE — 82570 ASSAY OF URINE CREATININE: CPT | Mod: ZL | Performed by: NURSE PRACTITIONER

## 2024-12-05 PROCEDURE — 82043 UR ALBUMIN QUANTITATIVE: CPT | Mod: ZL | Performed by: NURSE PRACTITIONER

## 2024-12-05 PROCEDURE — 82247 BILIRUBIN TOTAL: CPT | Mod: ZL | Performed by: NURSE PRACTITIONER

## 2024-12-05 PROCEDURE — 36415 COLL VENOUS BLD VENIPUNCTURE: CPT | Mod: ZL | Performed by: NURSE PRACTITIONER

## 2024-12-05 PROCEDURE — 84443 ASSAY THYROID STIM HORMONE: CPT | Mod: ZL | Performed by: NURSE PRACTITIONER

## 2024-12-05 PROCEDURE — 84155 ASSAY OF PROTEIN SERUM: CPT | Mod: ZL | Performed by: NURSE PRACTITIONER

## 2024-12-05 PROCEDURE — G0463 HOSPITAL OUTPT CLINIC VISIT: HCPCS

## 2024-12-05 PROCEDURE — 83036 HEMOGLOBIN GLYCOSYLATED A1C: CPT | Mod: ZL | Performed by: NURSE PRACTITIONER

## 2024-12-05 PROCEDURE — 82310 ASSAY OF CALCIUM: CPT | Mod: ZL | Performed by: NURSE PRACTITIONER

## 2024-12-05 RX ORDER — DEXTROAMPHETAMINE SACCHARATE, AMPHETAMINE ASPARTATE MONOHYDRATE, DEXTROAMPHETAMINE SULFATE AND AMPHETAMINE SULFATE 5; 5; 5; 5 MG/1; MG/1; MG/1; MG/1
20 CAPSULE, EXTENDED RELEASE ORAL DAILY
Qty: 30 CAPSULE | Refills: 0 | Status: SHIPPED | OUTPATIENT
Start: 2024-12-05 | End: 2025-01-04

## 2024-12-05 RX ORDER — OXYCODONE AND ACETAMINOPHEN 5; 325 MG/1; MG/1
1 TABLET ORAL EVERY 12 HOURS PRN
Qty: 60 TABLET | Refills: 0 | Status: SHIPPED | OUTPATIENT
Start: 2025-01-04

## 2024-12-05 RX ORDER — METHYLPHENIDATE HYDROCHLORIDE 20 MG/1
20 TABLET ORAL 2 TIMES DAILY
Qty: 60 TABLET | Refills: 0 | Status: SHIPPED | OUTPATIENT
Start: 2025-02-03 | End: 2025-03-05

## 2024-12-05 RX ORDER — DEXTROAMPHETAMINE SACCHARATE, AMPHETAMINE ASPARTATE MONOHYDRATE, DEXTROAMPHETAMINE SULFATE AND AMPHETAMINE SULFATE 5; 5; 5; 5 MG/1; MG/1; MG/1; MG/1
20 CAPSULE, EXTENDED RELEASE ORAL DAILY
Qty: 30 CAPSULE | Refills: 0 | Status: SHIPPED | OUTPATIENT
Start: 2025-01-04 | End: 2025-02-03

## 2024-12-05 RX ORDER — METHYLPHENIDATE HYDROCHLORIDE 20 MG/1
20 TABLET ORAL 2 TIMES DAILY
Qty: 60 TABLET | Refills: 0 | Status: SHIPPED | OUTPATIENT
Start: 2024-12-05 | End: 2025-01-04

## 2024-12-05 RX ORDER — OXYCODONE AND ACETAMINOPHEN 5; 325 MG/1; MG/1
1 TABLET ORAL EVERY 12 HOURS PRN
Qty: 60 TABLET | Refills: 0 | Status: SHIPPED | OUTPATIENT
Start: 2024-12-05

## 2024-12-05 RX ORDER — DEXTROAMPHETAMINE SACCHARATE, AMPHETAMINE ASPARTATE MONOHYDRATE, DEXTROAMPHETAMINE SULFATE AND AMPHETAMINE SULFATE 5; 5; 5; 5 MG/1; MG/1; MG/1; MG/1
20 CAPSULE, EXTENDED RELEASE ORAL DAILY
Qty: 30 CAPSULE | Refills: 0 | Status: SHIPPED | OUTPATIENT
Start: 2025-02-03 | End: 2025-03-05

## 2024-12-05 RX ORDER — OXYCODONE AND ACETAMINOPHEN 5; 325 MG/1; MG/1
1 TABLET ORAL EVERY 12 HOURS PRN
Qty: 60 TABLET | Refills: 0 | Status: SHIPPED | OUTPATIENT
Start: 2025-02-03

## 2024-12-05 RX ORDER — METHYLPHENIDATE HYDROCHLORIDE 20 MG/1
20 TABLET ORAL 2 TIMES DAILY
Qty: 60 TABLET | Refills: 0 | Status: SHIPPED | OUTPATIENT
Start: 2025-01-04 | End: 2025-02-03

## 2024-12-05 ASSESSMENT — PAIN SCALES - GENERAL: PAINLEVEL_OUTOF10: MODERATE PAIN (4)

## 2024-12-05 NOTE — LETTER
My Depression Action Plan  Name: Joe Bearden   Date of Birth 1945  Date: 12/5/2024    My doctor: Juan Nava   My clinic: [facility]  [address]  [place] [id number] [phone]            GREEN    ZONE   Good Control    What it looks like:   Things are going generally well. You have normal ups and downs. You may even feel depressed from time to time, but bad moods usually last less than a day.   What you need to do:  Continue to care for yourself (see self care plan)  Check your depression survival kit and update it as needed  Follow your physician s recommendations including any medication.  Do not stop taking medication unless you consult with your physician first.             YELLOW         ZONE Getting Worse    What it looks like:   Depression is starting to interfere with your life.   It may be hard to get out of bed; you may be starting to isolate yourself from others.  Symptoms of depression are starting to last most all day and this has happened for several days.   You may have suicidal thoughts but they are not constant.   What you need to do:     Call your care team. Your response to treatment will improve if you keep your care team informed of your progress. Yellow periods are signs an adjustment may need to be made.     Continue your self-care.  Just get dressed and ready for the day.  Don't give yourself time to talk yourself out of it.    Talk to someone in your support network.    Open up your Depression Self-Care Plan/Wellness Kit.             RED    ZONE Medical Alert - Get Help    What it looks like:   Depression is seriously interfering with your life.   You may experience these or other symptoms: You can t get out of bed most days, can t work or engage in other necessary activities, you have trouble taking care of basic hygiene, or basic responsibilities, thoughts of suicide or death that will not go away, self-injurious  behavior.     What you need to do:  Call your care team and request a same-day appointment. If they are not available (weekends or after hours) call your local crisis line, emergency room or 911.          Depression Self-Care Plan / Wellness Kit    Many people find that medication and therapy are helpful treatments for managing depression. In addition, making small changes to your everyday life can help to boost your mood and improve your wellbeing. Below are some tips for you to consider. Be sure to talk with your medical provider and/or behavioral health consultant if your symptoms are worsening or not improving.     Sleep   Sleep hygiene  means all of the habits that support good, restful sleep. It includes maintaining a consistent bedtime and wake time, using your bedroom only for sleeping or sex, and keeping the bedroom dark and free of distractions like a computer, smartphone, or television.     Develop a Healthy Routine  Maintain good hygiene. Get out of bed in the morning, make your bed, brush your teeth, take a shower, and get dressed. Don t spend too much time viewing media that makes you feel stressed. Find time to relax each day.    Exercise  Get some form of exercise every day. This will help reduce pain and release endorphins, the  feel good  chemicals in your brain. It can be as simple as just going for a walk or doing some gardening, anything that will get you moving.      Diet  Strive to eat healthy foods, including fruits and vegetables. Drink plenty of water. Avoid excessive sugar, caffeine, alcohol, and other mood-altering substances.     Stay Connected with Others  Stay in touch with friends and family members.    Manage Your Mood  Try deep breathing, massage therapy, biofeedback, or meditation. Take part in fun activities when you can. Try to find something to smile about each day.     Psychotherapy  Be open to working with a therapist if your provider recommends it.     Medication  Be sure to  take your medication as prescribed. Most anti-depressants need to be taken every day. It usually takes several weeks for medications to work. Not all medicines work for all people. It is important to follow-up with your provider to make sure you have a treatment plan that is working for you. Do not stop your medication abruptly without first discussing it with your provider.    Crisis Resources   These hotlines are for both adults and children. They and are open 24 hours a day, 7 days a week unless noted otherwise.    National Suicide Prevention Lifeline   988 or 3-154-825-JLBC (7280)    Crisis Text Line    www.crisistextline.org  Text HOME to 912640 from anywhere in the [place], anytime, about any type of crisis. A live, trained crisis counselor will receive the text and respond quickly.    Don Lifeline for LGBTQ Youth  A national crisis intervention and suicide lifeline for LGBTQ youth under 25. Provides a safe place to talk without judgement. Call 1-717.736.6447; text START to 502168 or visit www.thetrevorproject.org to talk to a trained counselor.    For [facility] crisis numbers, visit the [facility] website at:  https://[place].gov/dhs/people-we-serve/adults/health-care/mental-health/resources/crisis-contacts.jsp

## 2024-12-05 NOTE — PROGRESS NOTES
{PROVIDER CHARTING PREFERENCE:003638}    Monica Diaz is a 78 year old, presenting for the following health issues:  Diabetes    History of Present Illness       Back Pain:  He presents for follow up of back pain. Patient's back pain is a chronic problem.  Location of back pain:  Right lower back, left lower back, left side of neck and left shoulder  Description of back pain: dull ache and shooting  Back pain spreads: left side of neck    Since patient first noticed back pain, pain is: always present, but gets better and worse  Does back pain interfere with his job:  Yes       Diabetes:   He presents for follow up of diabetes.  He is checking home blood glucose a few times a week.   He checks blood glucose before meals and at bedtime.  Blood glucose is sometimes over 200 and never under 70. He is aware of hypoglycemia symptoms including shakiness.   He is concerned about other.   He is having numbness in feet, burning in feet, blurry vision and weight loss.            Heart Failure:  He presents for follow up of heart failure. He is experiencing shortness of breath with activity only,  He is experiencing lower extremity edema which is same as usual.   He denies orthopenea and is not coughing at night. Patient is not checking weight daily. He has recently had a None.  He has side effects from medications including other.  He has had no other medical visits for heart failure since the last visit.    Hyperlipidemia:  He presents for follow up of hyperlipidemia.   He is taking medication to lower cholesterol. He is not having myalgia or other side effects to statin medications.    Hypertension: He presents for follow up of hypertension.  He does not check blood pressure  regularly outside of the clinic. Outpatient blood pressures have not been over 140/90. He does not follow a low salt diet.     Vascular Disease:  He presents for follow up of vascular disease.      He takes daily aspirin.    He eats 0-1 servings  of fruits and vegetables daily.He consumes 2 sweetened beverage(s) daily.He exercises with enough effort to increase his heart rate 20 to 29 minutes per day.  He exercises with enough effort to increase his heart rate 4 days per week. He is missing 1 dose(s) of medications per week.     Last Echo: No results found.{Provider  Link to Heart Failure SmartSet :264861}  {MA/LPN/RN Pre-Provider Visit Orders- hCG/UA/Strep (Optional):473365}  {SUPERLIST (Optional):050899}  {additonal problems for provider to add (Optional):008057}    {ROS Picklists (Optional):854018}      Objective    BP (!) 158/78   Pulse 76   Resp 18   Wt 100.2 kg (221 lb)   SpO2 97%   BMI 31.71 kg/m    Body mass index is 31.71 kg/m .  Physical Exam   {Exam List (Optional):740807}    {Diagnostic Test Results (Optional):761768}        Signed Electronically by: SHANA Venegas CNP  {Email feedback regarding this note to [email or web address]   :184705}   night. Patient is not checking weight daily. He has recently had a None.  He has side effects from medications including other.  He has had no other medical visits for heart failure since the last visit.    Hyperlipidemia:  He presents for follow up of hyperlipidemia.   He is taking medication to lower cholesterol. He is not having myalgia or other side effects to statin medications.    Hypertension: He presents for follow up of hypertension.  He does not check blood pressure  regularly outside of the clinic. Outpatient blood pressures have not been over 140/90. He does not follow a low salt diet.     Vascular Disease:  He presents for follow up of vascular disease.      He takes daily aspirin.    He eats 0-1 servings of fruits and vegetables daily.He consumes 2 sweetened beverage(s) daily.He exercises with enough effort to increase his heart rate 20 to 29 minutes per day.  He exercises with enough effort to increase his heart rate 4 days per week. He is missing 1 dose(s) of medications per week.     Last Echo: No results found.    He comes in the clinic today with his daughter for medication management.  He is needing refills of Adderall, Ritalin and pain medication.  At his last visit, is recommended to reduce Adderall XR 20 mg to once daily, continue with Ritalin twice daily.  He reports he only got once daily medication and would like to go back.  He is having significant concerns with fatigue.  Denies any significant depression concerns.  He also is taking pain medications on a regular basis, finds that this is helpful most times.  He does have type 2 diabetes, due to have A1c checked today.  Checks blood sugars occasionally, a couple times recently they have been over 200, typically between 124 and 170.  He does not bring in his meter today.  Continues on diabetic medications without difficulties.      Objective    BP (!) 158/78   Pulse 76   Resp 18   Wt 100.2 kg (221 lb)   SpO2 97%   BMI 31.71 kg/m     Body mass index is 31.71 kg/m .  Physical Exam   GENERAL: alert and no distress  EYES: Eyes grossly normal to inspection  RESP: lungs clear to auscultation - no rales, rhonchi or wheezes  CV: regular rate and rhythm, normal S1 S2  NEURO: Normal strength and tone, mentation intact and speech normal  PSYCH: mentation appears normal, affect normal/bright          Results for orders placed or performed in visit on 12/05/24   Hemoglobin A1c     Status: Abnormal   Result Value Ref Range    Estimated Average Glucose 148 (H) <117 mg/dL    Hemoglobin A1C 6.8 (H) <5.7 %   Albumin Random Urine Quantitative with Creat Ratio     Status: Abnormal   Result Value Ref Range    Creatinine Urine mg/dL 123.6 mg/dL    Albumin Urine mg/L 39.3 mg/L    Albumin Urine mg/g Cr 31.80 (H) 0.00 - 17.00 mg/g Cr   TSH Reflex GH     Status: Normal   Result Value Ref Range    TSH 1.30 0.30 - 4.20 uIU/mL   Comprehensive Metabolic Panel     Status: Abnormal   Result Value Ref Range    Sodium 140 135 - 145 mmol/L    Potassium 4.2 3.4 - 5.3 mmol/L    Carbon Dioxide (CO2) 29 22 - 29 mmol/L    Anion Gap 8 7 - 15 mmol/L    Urea Nitrogen 19.7 8.0 - 23.0 mg/dL    Creatinine 1.46 (H) 0.67 - 1.17 mg/dL    GFR Estimate 49 (L) >60 mL/min/1.73m2    Calcium 9.4 8.8 - 10.4 mg/dL    Chloride 103 98 - 107 mmol/L    Glucose 140 (H) 70 - 99 mg/dL    Alkaline Phosphatase 89 40 - 150 U/L    AST 20 0 - 45 U/L    ALT 20 0 - 70 U/L    Protein Total 7.4 6.4 - 8.3 g/dL    Albumin 4.5 3.5 - 5.2 g/dL    Bilirubin Total 0.8 <=1.2 mg/dL         Signed Electronically by: SHANA Venegas CNP

## 2024-12-05 NOTE — LETTER
Opioid / Opioid Plus Controlled Substance Agreement    This is an agreement between you and your provider about the safe and appropriate use of controlled substance/opioids prescribed by your care team. Controlled substances are medicines that can cause physical and mental dependence (abuse).    There are strict laws about having and using these medicines. We here at [facility] are committing to working with you in your efforts to get better. To support you in this work, we ll help you schedule regular office appointments for medicine refills. If we must cancel or change your appointment for any reason, we ll make sure you have enough medicine to last until your next appointment.     As a Provider, I will:  Listen carefully to your concerns and treat you with respect.   Recommend a treatment plan that I believe is in your best interest. This plan may involve therapies other than opioid pain medication.   Talk with you often about the possible benefits, and the risk of harm of any medicine that we prescribe for you.   Provide a plan on how to taper (discontinue or go off) using this medicine if the decision is made to stop its use.    As a Patient, I understand that opioid(s):   Are a controlled substance prescribed by my care team to help me function or work and manage my condition(s).   Are strong medicines and can cause serious side effects such as:  Drowsiness, which can seriously affect my driving ability  A lower breathing rate, enough to cause death  Harm to my thinking ability   Depression   Abuse of and addiction to this medicine  Need to be taken exactly as prescribed. Combining opioids with certain medicines or chemicals (such as illegal drugs, sedatives, sleeping pills, and benzodiazepines) can be dangerous or even fatal. If I stop opioids suddenly, I may have severe withdrawal symptoms.  Do not work for all types of pain nor for all patients. If they re not helpful, I may be asked to stop  them.    I am also being prescribed a stimulant controlled substance to help manage symptoms of attention deficit, appetite suppression, and/or fatigue.    The risks, benefits and side effects of these medicine(s) were explained to me. I agree that:  I will take part in other treatments as advised by my care team. This may be psychiatry or counseling, physical therapy, behavioral therapy, group treatment or a referral to a specialist.     I will keep all my appointments. I understand that this is part of the monitoring of opioids. My care team may require an office visit for EVERY opioid/controlled substance refill. If I miss appointments or don t follow instructions, my care team may stop my medicine.    I will take my medicines as prescribed. I will not change the dose or schedule unless my care team tells me to. There will be no refills if I run out early.     I may be asked to come to the clinic and complete a urine drug test or complete a pill count at any time. If I don t give a urine sample or participate in a pill count, the care team may stop my medicine.    I will only receive prescriptions from this clinic for chronic pain. If I am treated by another provider for acute pain issues, I will tell them that I am taking opioid pain medication for chronic pain and that I have a treatment agreement with this provider. I will inform my [facility] care team within one business day if I am given a prescription for any pain medication by another healthcare provider. My [facility] care team can contact other providers and pharmacists about my use of any medicines.    It is up to me to make sure that I don t run out of my medicines on weekends or holidays. If my care team is willing to refill my opioid prescription without a visit, I must request refills only during office hours. Refills may take up to 3 business days to process. I will use one pharmacy to fill all my opioid and other controlled  substance prescriptions. I will notify the clinic about any changes to my insurance or medication availability.    I am responsible for my prescriptions. If the medicine/prescription is lost, stolen or destroyed, it will not be replaced. I also agree not to share controlled substance medicines with anyone.    I am aware I should not use any illegal or recreational drugs. I agree not to drink alcohol unless my care team says I can.       If I enroll in the [place] [facility] Cannabis program, I will tell my care team prior to my next refill.     I will tell my care team right away if I become pregnant, have a new medical problem treated outside of my regular clinic, or have a change in my medications.    I understand that this medicine can affect my thinking, judgment and reaction time. Alcohol and drugs affect the brain and body, which can affect the safety of my driving. Being under the influence of alcohol or drugs can affect my decision-making, behaviors, personal safety, and the safety of others. Driving while impaired (DWI) can occur if a person is driving, operating, or in physical control of a car, motorcycle, boat, snowmobile, ATV, motorbike, off-road vehicle, or any other motor vehicle ([place] Statute 169A.20). I understand the risk if I choose to drive or operate any vehicle or machinery.    I understand that if I do not follow any of the conditions above, my prescriptions or treatment may be stopped or changed.          Opioids  What You Need to Know    What are opioids?   Opioids are pain medicines that must be prescribed by a doctor. They are also known as narcotics.     Examples are:   morphine (MS Contin, Michell)  oxycodone (Oxycontin)  oxycodone and acetaminophen (Percocet)  hydrocodone and acetaminophen (Vicodin, Norco)   fentanyl patch (Duragesic)   hydromorphone (Dilaudid)   methadone  codeine (Tylenol #3)     What do opioids do well?   Opioids are best for severe short-term pain such as after a  surgery or injury. They may work well for cancer pain. They may help some people with long-lasting (chronic) pain.     What do opioids NOT do well?   Opioids never get rid of pain entirely, and they don t work well for most patients with chronic pain. Opioids don t reduce swelling, one of the causes of pain.                                    Other ways to manage chronic pain and improve function include:     Treat the health problem that may be causing pain  Anti-inflammation medicines, which reduce swelling and tenderness, such as ibuprofen (Advil, Motrin) or naproxen (Aleve)  Acetaminophen (Tylenol)  Antidepressants and anti-seizure medicines, especially for nerve pain  Topical treatments such as patches or creams  Injections or nerve blocks  Chiropractic or osteopathic treatment  Acupuncture, massage, deep breathing, meditation, visual imagery, aromatherapy  Use heat or ice at the pain site  Physical therapy   Exercise  Stop smoking  Take part in therapy       Risks and side effects     Talk to your doctor before you start or decide to keep taking opioids. Possible side effects include:    Lowering your breathing rate enough to cause death  Overdose, including death, especially if taking higher than prescribed doses  Worse depression symptoms; less pleasure in things you usually enjoy  Feeling tired or sluggish  Slower thoughts or cloudy thinking  Being more sensitive to pain over time; pain is harder to control  Trouble sleeping or restless sleep  Changes in hormone levels (for example, less testosterone)  Changes in sex drive or ability to have sex  Constipation  Unsafe driving  Itching and sweating  Dizziness  Nausea, throwing up and dry mouth    What else should I know about opioids?    Opioids may lead to dependence, tolerance, or addiction.    Dependence means that if you stop or reduce the medicine too quickly, you will have withdrawal symptoms. These include loose poop (diarrhea), jitters, flu-like  symptoms, nervousness and tremors. Dependence is not the same as addiction.                     Tolerance means needing higher doses over time to get the same effect. This may increase the chance of serious side effects.    Addiction is when people improperly use a substance that harms their body, their mind or their relations with others. Use of opiates can cause a relapse of addiction if you have a history of drug or alcohol abuse.    People who have used opioids for a long time may have a lower quality of life, worse depression, higher levels of pain and more visits to doctors.    You can overdose on opioids. Take these steps to lower your risk of overdose:    Recognize the signs:  Signs of overdose include decrease or loss of consciousness (blackout), slowed breathing, trouble waking up and blue lips. If someone is worried about overdose, they should call 911.    Talk to your doctor about Narcan (naloxone).   If you are at risk for overdose, you may be given a prescription for Narcan. This medicine very quickly reverses the effects of opioids.   If you overdose, a friend or family member can give you Narcan while waiting for the ambulance. They need to know the signs of overdose and how to give Narcan.     Don't use alcohol or street drugs.   Taking them with opioids can cause death.    Do not take any of these medicines unless your doctor says it s OK. Taking these with opioids can cause death:  Benzodiazepines, such as lorazepam (Ativan), alprazolam (Xanax) or diazepam (Valium)  Muscle relaxers, such as cyclobenzaprine (Flexeril)  Sleeping pills like zolpidem (Ambien)   Other opioids      How to keep you and other people safe while taking opioids:    Never share your opioids with others.  Opioid medicines are regulated by the Drug Enforcement Agency ([employer]). Selling or sharing medications is a criminal act.    2. Be sure to store opioids in a secure place, locked up if possible. Young children can easily swallow  them and overdose.    3. When you are traveling with your medicines, keep them in the original bottles. If you use a pill box, be sure you also carry a copy of your medicine list from your clinic or pharmacy.    4. Safe disposal of opioids    Most pharmacies have places to get rid of medicine, called disposal kiosks. Medicine disposal options are also available in every [facility]. Search your county and  medication disposal  to find more options. You can find more details at:  https://www.pca.[facility].[place]./living-green/managing-unwanted-medications     I agree that my provider, clinic care team, and pharmacy may work with any city, state or federal law enforcement agency that investigates the misuse, sale, or other diversion of my controlled medicine. I will allow my provider to discuss my care with, or share a copy of, this agreement with any other treating provider, pharmacy or emergency room where I receive care.    I have read this agreement and have asked questions about anything I did not understand.    _______________________________________________________  Patient Signature - Joe Bearden _____________________                   Date     _______________________________________________________  Provider Signature - SHANA Venegas CNP   _____________________                   Date     _______________________________________________________  Witness Signature (required if provider not present while patient signing)   _____________________                   Date

## 2024-12-05 NOTE — LETTER
December 6, 2024      Joe Bearden  [address]   [place] [address]        Dear ,    We are writing to inform you of your test results.    Your test results fall within the expected range(s) or remain unchanged from previous results.  Please continue with current treatment plan.    Resulted Orders   Hemoglobin A1c   Result Value Ref Range    Estimated Average Glucose 148 (H) <117 mg/dL    Hemoglobin A1C 6.8 (H) <5.7 %      Comment:      Normal <5.7%   Prediabetes 5.7-6.4%    Diabetes 6.5% or higher     Note: Adopted from ADA consensus guidelines.   Albumin Random Urine Quantitative with Creat Ratio   Result Value Ref Range    Creatinine Urine mg/dL 123.6 mg/dL      Comment:      The reference ranges have not been established in urine creatinine. The results should be integrated into the clinical context for interpretation.    Albumin Urine mg/L 39.3 mg/L      Comment:      The reference ranges have not been established in urine albumin. The results should be integrated into the clinical context for interpretation.    Albumin Urine mg/g Cr 31.80 (H) 0.00 - 17.00 mg/g Cr      Comment:      Microalbuminuria is defined as an albumin:creatinine ratio of 17 to 299 for males and 25 to 299 for females. A ratio of albumin:creatinine of 300 or higher is indicative of overt proteinuria.  Due to biologic variability, positive results should be confirmed by a second, first-morning random or 24-hour timed urine specimen. If there is discrepancy, a third specimen is recommended. When 2 out of 3 results are in the microalbuminuria range, this is evidence for incipient nephropathy and warrants increased efforts at glucose control, blood pressure control, and institution of therapy with an angiotensin-converting-enzyme (ACE) inhibitor (if the patient can tolerate it).     TSH Reflex GH   Result Value Ref Range    TSH 1.30 0.30 - 4.20 uIU/mL   Comprehensive Metabolic Panel   Result Value Ref Range     Sodium 140 135 - 145 mmol/L    Potassium 4.2 3.4 - 5.3 mmol/L    Carbon Dioxide (CO2) 29 22 - 29 mmol/L    Anion Gap 8 7 - 15 mmol/L    Urea Nitrogen 19.7 8.0 - 23.0 mg/dL    Creatinine 1.46 (H) 0.67 - 1.17 mg/dL    GFR Estimate 49 (L) >60 mL/min/1.73m2      Comment:      eGFR calculated using 2021 CKD-EPI equation.    Calcium 9.4 8.8 - 10.4 mg/dL      Comment:      Reference intervals for this test were updated on 7/16/2024 to reflect our healthy population more accurately. There may be differences in the flagging of prior results with similar values performed with this method. Those prior results can be interpreted in the context of the updated reference intervals.    Chloride 103 98 - 107 mmol/L    Glucose 140 (H) 70 - 99 mg/dL    Alkaline Phosphatase 89 40 - 150 U/L    AST 20 0 - 45 U/L    ALT 20 0 - 70 U/L    Protein Total 7.4 6.4 - 8.3 g/dL    Albumin 4.5 3.5 - 5.2 g/dL    Bilirubin Total 0.8 <=1.2 mg/dL       If you have any questions or concerns, please call the clinic at the number listed above.       Sincerely,      SHANA Lynn CNP

## 2024-12-05 NOTE — NURSING NOTE
Patient presents today for diabetic follow up and chronic pain.    Medication Reconciliation Complete    Kimberli Quintanilla LPN  12/5/2024 3:28 PM

## 2024-12-05 NOTE — PATIENT INSTRUCTIONS
Adderall 1 tablet daily  Ritalin 1 tablet twice daily  Monitor blood pressures a few times a week, goal to be under 140/90  Follow-up in 3 months to see your doctor for medication refill and annual wellness exam  We will call with lab results when completed

## 2024-12-11 DIAGNOSIS — I10 PRIMARY HYPERTENSION: ICD-10-CM

## 2024-12-11 RX ORDER — LOSARTAN POTASSIUM 100 MG/1
100 TABLET ORAL DAILY
Qty: 90 TABLET | Refills: 0 | Status: SHIPPED | OUTPATIENT
Start: 2024-12-11

## 2024-12-11 NOTE — TELEPHONE ENCOUNTER
[id number] of [place] sent Rx request for the following:      Requested Prescriptions   Pending Prescriptions Disp Refills    losartan (COZAAR) 100 MG tablet [Pharmacy Med Name: LOSARTAN 100MG TABLET] 90 tablet 3     Sig: TAKE 1 TABLET (100 MG) BY MOUTH DAILY       Angiotensin-II Receptors Failed - 12/11/2024  9:05 AM        Failed - Most recent blood pressure under 140/90 in past 12 months     BP Readings from Last 3 Encounters:   12/05/24 (!) 146/82   08/05/24 130/68   07/22/24 (!) 150/62   No data recorded        Failed - Has GFR on file in past 12 months and most recent value is normal     Last Prescription Date:   11/29/23  Last Fill Qty/Refills:         90, R-3    Last Office Visit:              12/5/24   Future Office visit:             Future Appointments 12/11/2024 - 6/9/2025        Date Visit Type Length Department Provider     3/4/2025  1:00 PM OFFICE VISIT 20 min  [facility] Faby Turcios APRN CNP    Location Instructions:     [facility] [place] [address]                   Per LOV note:  Recommend continue to monitor blood pressures a few times a week, will follow-up with primary care provider in 3 months for medication refill, blood pressure recheck and annual wellness exam, sooner with concerns. Labs are stable today.     Unable to complete prescription refill per RN Medication Refill Policy. Hue Lagunas RN .............. 12/11/2024  9:09 AM

## 2024-12-15 DIAGNOSIS — E78.5 HYPERLIPIDEMIA LDL GOAL <70: ICD-10-CM

## 2024-12-17 RX ORDER — ATORVASTATIN CALCIUM 40 MG/1
40 TABLET, FILM COATED ORAL AT BEDTIME
Qty: 90 TABLET | Refills: 0 | Status: SHIPPED | OUTPATIENT
Start: 2024-12-17

## 2024-12-17 NOTE — TELEPHONE ENCOUNTER
[facility]acy [facility][id number] sent Rx request for the following:      Requested Prescriptions   Pending Prescriptions Disp Refills    atorvastatin (LIPITOR) 40 MG tablet [Pharmacy Med Name: ATORVASTATIN 40MG TABLET] 90 tablet 0     Sig: TAKE 1 TABLET BY MOUTH AT BEDTIME       Antihyperlipidemic agents Failed - 12/17/2024  1:35 PM        Failed - LDL on file in the past 12 months     Last Prescription Date:   8/21/24  Last Fill Qty/Refills:         90, R-0    Last Office Visit:             12/5/24   Future Office visit:             Next 5 appointments (look out 90 days)      Mar 04, 2025 1:15 PM  (Arrive by 1:00 PM)  Provider Visit with SHANA Lynn [facility] ([facility]) [address]  [place] [id number] [phone]           Unable to complete prescription refill per RN Medication Refill Policy.     Rene Gomez RN on 12/17/2024 at 1:37 PM

## 2024-12-19 DIAGNOSIS — I10 PRIMARY HYPERTENSION: ICD-10-CM

## 2024-12-24 RX ORDER — AMLODIPINE BESYLATE 10 MG/1
10 TABLET ORAL DAILY
Qty: 90 TABLET | Refills: 0 | Status: SHIPPED | OUTPATIENT
Start: 2024-12-24

## 2024-12-24 NOTE — TELEPHONE ENCOUNTER
Requested Prescriptions   Pending Prescriptions Disp Refills    amLODIPine (NORVASC) 10 MG tablet [Pharmacy Med Name: AMLODIPINE 10MG TABLET] 90 tablet 3     Sig: TAKE 1 TABLET (10 MG) BY MOUTH DAILY       Calcium Channel Blockers Protocol  Failed - 12/23/2024  6:09 PM        Failed - Most recent blood pressure under 140/90 in past 12 months     BP Readings from Last 3 Encounters:   12/05/24 (!) 146/82   08/05/24 130/68   07/22/24 (!) 150/62       No data recorded            Failed - GFR is on file in the past 12 months and most recent GFR is normal   GFR Estimate   Date Value Ref Range Status   12/05/2024 49 (L) >60 mL/min/1.73m2 Final     Comment:     eGFR calculated using 2021 CKD-EPI equation.   06/11/2024 62 >60 mL/min/1.73m2 Final   11/29/2023 60 (L) >60 mL/min/1.73m2 Final        Passed - Medication is active on med list        Passed - Recent (12 mo) or future (90 days) visit within the authorizing provider's specialty     The patient must have completed an in-person or virtual visit within the past 12 months or has a future visit scheduled within the next 90 days with the authorizing provider s specialty.  Urgent care and e-visits do not qualify as an office visit for this protocol.          Passed - Patient is age 18 or older               Last Prescription Date:   2/29/2024  Last Fill Qty/Refills:         90, R-3     LOV: 12/5/2024  Future Office visit:    Next 5 appointments (look out 90 days)      Mar 04, 2025 1:15 PM  (Arrive by 1:00 PM)  Provider Visit with SHAAN Lynn [facility] ([facility]) [address]  [place] [address] [phone]               Routing refill request to provider for review/approval as it has failed refill protocol.    Sally Bowling RN  ....................  12/23/2024   6:09 PM

## 2025-01-05 ENCOUNTER — HEALTH MAINTENANCE LETTER (OUTPATIENT)
Age: 80
End: 2025-01-05

## 2025-01-13 DIAGNOSIS — M10.9 GOUT, UNSPECIFIED CAUSE, UNSPECIFIED CHRONICITY, UNSPECIFIED SITE: ICD-10-CM

## 2025-01-14 RX ORDER — ALLOPURINOL 100 MG/1
100 TABLET ORAL DAILY
Qty: 90 TABLET | Refills: 0 | Status: SHIPPED | OUTPATIENT
Start: 2025-01-14

## 2025-01-14 NOTE — TELEPHONE ENCOUNTER
[id number] sent Rx request for the following:      Requested Prescriptions   Pending Prescriptions Disp Refills    allopurinol (ZYLOPRIM) 100 MG tablet [Pharmacy Med Name: ALLOPURINOL 100MG TABLET] 90 tablet 3     Sig: TAKE 1 TABLET (100 MG) BY MOUTH DAILY       Gout Agents Protocol Failed - 1/14/2025  3:30 PM        Failed - Has Uric Acid on file in past 12 months and value is less than 6     No lab results found.  If level is 6mg/dL or greater, ok to refill one time and refer to provider.         Failed - Has GFR on file in past 12 months and most recent value is normal     Last Prescription Date:   11/29/23  Last Fill Qty/Refills:         90, R-3    Last Office Visit:                12/5/24 11/29/23 (Gout discussed)    Future Office visit:           3/4/25    Unable to complete prescription refill per RN Medication Refill Policy. Hue Lagunas RN .............. 1/14/2025  3:53 PM

## 2025-01-22 ENCOUNTER — TRANSFERRED RECORDS (OUTPATIENT)
Dept: HEALTH INFORMATION MANAGEMENT | Facility: OTHER | Age: 80
End: 2025-01-22
Payer: COMMERCIAL

## 2025-01-22 LAB — RETINOPATHY: NEGATIVE

## 2025-02-08 DIAGNOSIS — E11.69 TYPE 2 DIABETES MELLITUS WITH OTHER SPECIFIED COMPLICATION, WITH LONG-TERM CURRENT USE OF INSULIN (H): Primary | ICD-10-CM

## 2025-02-08 DIAGNOSIS — Z79.4 TYPE 2 DIABETES MELLITUS WITH OTHER SPECIFIED COMPLICATION, WITH LONG-TERM CURRENT USE OF INSULIN (H): Primary | ICD-10-CM

## 2025-02-12 RX ORDER — DULAGLUTIDE 1.5 MG/.5ML
INJECTION, SOLUTION SUBCUTANEOUS
Qty: 6 ML | Refills: 0 | Status: SHIPPED | OUTPATIENT
Start: 2025-02-12

## 2025-02-12 NOTE — TELEPHONE ENCOUNTER
[facility] [place], [place] sent Rx request for the following:      Requested Prescriptions   Pending Prescriptions Disp Refills    TRULICITY 1.5 MG/0.5ML pen [Pharmacy Med Name: Trulicity Subcutaneous Solution Auto-injector 1.5 MG/0.5ML] 6 mL 0     Sig: INJECT 1.5 MG (0.5ML) UNDER THE SKIN ONCE A WEEK       GLP-1 Agonists Protocol Failed - 2/12/2025  1:20 PM        Failed - Medication is active on med list and the sig matches. RN to manually verify dose and sig if red X/fail.     If the protocol passes (green check), you do not need to verify med dose and sig.    A prescription matches if they are the same clinical intention.  For Example: once daily and every morning are the same.  For all fails (red x), verify dose and sig.    If the refill does match what is on file, the RN can still proceed to approve the refill request.  If they do not match, route to the appropriate provider.        Failed - Has GFR on file in past 12 months and most recent value is normal        Failed - Medication indicated for associated diagnosis     Medication is associated with one or more of the following diagnoses:     Type 2 diabetes mellitus      Last Prescription Date:   11/29/23  Last Fill Qty/Refills:         5 ml, R-4    Last Office Visit:              12/5/24 (DM discussed)   Future Office visit:             Next 5 appointments (look out 90 days)      Mar 04, 2025 1:15 PM  (Arrive by 1:00 PM)  Provider Visit with SHANA Lynn [facility] [place] [facility] ([facility]) [address]  [place] [id number] [phone]     Unable to complete prescription refill per RN Medication Refill Policy. Hue Lagunas RN .............. 2/12/2025  1:23 PM

## 2025-02-14 DIAGNOSIS — F34.1 DYSTHYMIA: ICD-10-CM

## 2025-02-14 DIAGNOSIS — K21.9 GASTROESOPHAGEAL REFLUX DISEASE WITHOUT ESOPHAGITIS: ICD-10-CM

## 2025-02-14 DIAGNOSIS — Z00.00 HEALTH CARE MAINTENANCE: ICD-10-CM

## 2025-02-14 DIAGNOSIS — E03.9 HYPOTHYROIDISM, UNSPECIFIED TYPE: ICD-10-CM

## 2025-02-14 DIAGNOSIS — I10 PRIMARY HYPERTENSION: ICD-10-CM

## 2025-02-17 DIAGNOSIS — K59.1 FUNCTIONAL DIARRHEA: ICD-10-CM

## 2025-02-18 NOTE — TELEPHONE ENCOUNTER
loperamide (IMODIUM) 2 MG capsule          Last Written Prescription Date:  11/26/24  Last Fill Quantity: 270,   # refills: 0  Last Office Visit: 12/5/24  Future Office visit:    Next 5 appointments (look out 90 days)      Mar 04, 2025 1:15 PM  (Arrive by 1:00 PM)  Provider Visit with SHANA Lynn [facility] ([facility]) [address]  [place] [id number] [phone]             Routing refill request to provider for review/approval because:  Drug not on the [facility], [facility] or [facility] refill protocol or controlled substance. Unable to complete prescription refill per RNMedication Refill Policy.................... Izabel Kapadia RN ....................  2/18/2025   4:09 PM

## 2025-02-19 RX ORDER — LOPERAMIDE HYDROCHLORIDE 2 MG/1
CAPSULE ORAL
Qty: 270 CAPSULE | Refills: 0 | Status: SHIPPED | OUTPATIENT
Start: 2025-02-19

## 2025-02-19 RX ORDER — LOSARTAN POTASSIUM 100 MG/1
100 TABLET ORAL DAILY
Qty: 90 TABLET | Refills: 0 | Status: SHIPPED | OUTPATIENT
Start: 2025-02-19

## 2025-02-19 RX ORDER — LEVOTHYROXINE SODIUM 200 UG/1
TABLET ORAL
Qty: 90 TABLET | Refills: 0 | Status: SHIPPED | OUTPATIENT
Start: 2025-02-19

## 2025-02-19 RX ORDER — PANTOPRAZOLE SODIUM 20 MG/1
20 TABLET, DELAYED RELEASE ORAL DAILY
Qty: 90 TABLET | Refills: 0 | Status: SHIPPED | OUTPATIENT
Start: 2025-02-19

## 2025-02-19 RX ORDER — FOLIC ACID 1 MG/1
1000 TABLET ORAL DAILY
Qty: 90 TABLET | Refills: 0 | Status: SHIPPED | OUTPATIENT
Start: 2025-02-19

## 2025-02-19 RX ORDER — FLUOXETINE HYDROCHLORIDE 40 MG/1
40 CAPSULE ORAL EVERY MORNING
Qty: 90 CAPSULE | Refills: 0 | Status: SHIPPED | OUTPATIENT
Start: 2025-02-19

## 2025-02-19 NOTE — TELEPHONE ENCOUNTER
[id number] sent Rx request for the following:      Requested Prescriptions   Pending Prescriptions Disp Refills    folic acid (FOLVITE) 1 MG tablet [Pharmacy Med Name: FOLIC ACID 1MG TABLET] 90 tablet 0     Sig: TAKE 1 TAB BY MOUTH ONCE DAILY        Last Prescription Date:   8/21/24  Last Fill Qty/Refills:         90, R-0             levothyroxine (SYNTHROID/LEVOTHROID) 200 MCG tablet [Pharmacy Med Name: LEVOTHYROXINE 200MCG TABLET] 90 tablet 0     Sig: TAKE 1 TABLET (200 MCG) BY MOUTH BEFORE BREAKFAST.        Last Prescription Date:   11/20/24  Last Fill Qty/Refills:         90, R-0             FLUoxetine (PROZAC) 40 MG capsule [Pharmacy Med Name: FLUOXETINE 40MG CAPSULE] 90 capsule 0     Sig: TAKE 1 CAPSULE (40 MG) BY MOUTH EVERY MORNING.       SSRIs Protocol Failed - 2/19/2025 10:48 AM        Failed - PHQ-9 score less than 5 in past 6 months     Please review last PHQ-9 score.        Last Prescription Date:   11/20/24  Last Fill Qty/Refills:         90, R-0             pantoprazole (PROTONIX) 20 MG EC tablet [Pharmacy Med Name: PANTOPRAZOLE 20MG DR TABLET] 90 tablet 0     Sig: TAKE 1 TABLET (20 MG) BY MOUTH DAILY        Last Prescription Date:   11/20/24  Last Fill Qty/Refills:         90, R-0             losartan (COZAAR) 100 MG tablet [Pharmacy Med Name: LOSARTAN 100MG TABLET] 90 tablet 0     Sig: TAKE 1 TABLET (100 MG) BY MOUTH DAILY       Angiotensin-II Receptors Failed - 2/19/2025 10:48 AM        Failed - Most recent blood pressure under 140/90 in past 12 months     BP Readings from Last 3 Encounters:   12/05/24 (!) 146/82   08/05/24 130/68   07/22/24 (!) 150/62       No data recorded            Failed - Has GFR on file in past 12 months and most recent value is normal          Last Prescription Date:   12/11/24  Last Fill Qty/Refills:         90, R-0    Last Office Visit:              12/5/24   Future Office visit:           3/4/25      Routing refill request to provider for  review/approval because:  Drug not on the [facility] refill protocol     Ro Heaton RN on 2/19/2025 at 10:52 AM

## 2025-03-03 DIAGNOSIS — F11.20 CONTINUOUS OPIOID DEPENDENCE (H): ICD-10-CM

## 2025-03-03 DIAGNOSIS — F11.20 CHRONIC NARCOTIC DEPENDENCE (H): ICD-10-CM

## 2025-03-03 DIAGNOSIS — F90.2 ATTENTION DEFICIT HYPERACTIVITY DISORDER (ADHD), COMBINED TYPE: ICD-10-CM

## 2025-03-03 DIAGNOSIS — G47.419 PRIMARY NARCOLEPSY WITHOUT CATAPLEXY: ICD-10-CM

## 2025-03-04 ENCOUNTER — OFFICE VISIT (OUTPATIENT)
Dept: FAMILY MEDICINE | Facility: OTHER | Age: 80
End: 2025-03-04
Attending: NURSE PRACTITIONER
Payer: MEDICARE

## 2025-03-04 VITALS
WEIGHT: 229 LBS | HEART RATE: 68 BPM | HEIGHT: 70 IN | SYSTOLIC BLOOD PRESSURE: 146 MMHG | RESPIRATION RATE: 18 BRPM | TEMPERATURE: 97.7 F | OXYGEN SATURATION: 96 % | DIASTOLIC BLOOD PRESSURE: 78 MMHG | BODY MASS INDEX: 32.78 KG/M2

## 2025-03-04 DIAGNOSIS — E11.69 TYPE 2 DIABETES MELLITUS WITH OTHER SPECIFIED COMPLICATION, WITH LONG-TERM CURRENT USE OF INSULIN (H): ICD-10-CM

## 2025-03-04 DIAGNOSIS — E66.01 MORBID (SEVERE) OBESITY DUE TO EXCESS CALORIES (H): ICD-10-CM

## 2025-03-04 DIAGNOSIS — I10 PRIMARY HYPERTENSION: ICD-10-CM

## 2025-03-04 DIAGNOSIS — F11.20 CONTINUOUS OPIOID DEPENDENCE (H): ICD-10-CM

## 2025-03-04 DIAGNOSIS — Z79.4 TYPE 2 DIABETES MELLITUS WITH OTHER SPECIFIED COMPLICATION, WITH LONG-TERM CURRENT USE OF INSULIN (H): ICD-10-CM

## 2025-03-04 DIAGNOSIS — F90.2 ATTENTION DEFICIT HYPERACTIVITY DISORDER (ADHD), COMBINED TYPE: Primary | ICD-10-CM

## 2025-03-04 DIAGNOSIS — F11.20 CHRONIC NARCOTIC DEPENDENCE (H): ICD-10-CM

## 2025-03-04 LAB
EST. AVERAGE GLUCOSE BLD GHB EST-MCNC: 154 MG/DL
HBA1C MFR BLD: 7 %
URATE SERPL-MCNC: 4.7 MG/DL (ref 3.4–7)

## 2025-03-04 PROCEDURE — 84550 ASSAY OF BLOOD/URIC ACID: CPT | Mod: ZL | Performed by: NURSE PRACTITIONER

## 2025-03-04 PROCEDURE — 83036 HEMOGLOBIN GLYCOSYLATED A1C: CPT | Mod: ZL | Performed by: NURSE PRACTITIONER

## 2025-03-04 PROCEDURE — 36415 COLL VENOUS BLD VENIPUNCTURE: CPT | Mod: ZL | Performed by: NURSE PRACTITIONER

## 2025-03-04 PROCEDURE — G0463 HOSPITAL OUTPT CLINIC VISIT: HCPCS

## 2025-03-04 RX ORDER — OXYCODONE AND ACETAMINOPHEN 5; 325 MG/1; MG/1
1 TABLET ORAL EVERY 12 HOURS PRN
Qty: 60 TABLET | Refills: 0 | Status: SHIPPED | OUTPATIENT
Start: 2025-03-04

## 2025-03-04 RX ORDER — OXYCODONE AND ACETAMINOPHEN 5; 325 MG/1; MG/1
1 TABLET ORAL EVERY 12 HOURS PRN
Qty: 60 TABLET | Refills: 0 | Status: SHIPPED | OUTPATIENT
Start: 2025-05-03

## 2025-03-04 RX ORDER — METHYLPHENIDATE HYDROCHLORIDE 20 MG/1
20 TABLET ORAL 2 TIMES DAILY
Qty: 60 TABLET | Refills: 0 | Status: SHIPPED | OUTPATIENT
Start: 2025-03-04

## 2025-03-04 RX ORDER — DEXTROAMPHETAMINE SACCHARATE, AMPHETAMINE ASPARTATE MONOHYDRATE, DEXTROAMPHETAMINE SULFATE AND AMPHETAMINE SULFATE 5; 5; 5; 5 MG/1; MG/1; MG/1; MG/1
20 CAPSULE, EXTENDED RELEASE ORAL DAILY
Qty: 30 CAPSULE | Refills: 0 | Status: SHIPPED | OUTPATIENT
Start: 2025-05-03 | End: 2025-06-02

## 2025-03-04 RX ORDER — METHYLPHENIDATE HYDROCHLORIDE 20 MG/1
20 TABLET ORAL 2 TIMES DAILY
Qty: 60 TABLET | Refills: 0 | Status: SHIPPED | OUTPATIENT
Start: 2025-04-03

## 2025-03-04 RX ORDER — METHYLPHENIDATE HYDROCHLORIDE 20 MG/1
20 TABLET ORAL 2 TIMES DAILY
Qty: 60 TABLET | Refills: 0 | Status: SHIPPED | OUTPATIENT
Start: 2025-05-03

## 2025-03-04 RX ORDER — OXYCODONE AND ACETAMINOPHEN 5; 325 MG/1; MG/1
TABLET ORAL
Qty: 60 TABLET | Refills: 0 | OUTPATIENT
Start: 2025-03-04

## 2025-03-04 RX ORDER — DEXTROAMPHETAMINE SACCHARATE, AMPHETAMINE ASPARTATE MONOHYDRATE, DEXTROAMPHETAMINE SULFATE AND AMPHETAMINE SULFATE 5; 5; 5; 5 MG/1; MG/1; MG/1; MG/1
20 CAPSULE, EXTENDED RELEASE ORAL DAILY
Qty: 30 CAPSULE | Refills: 0 | Status: SHIPPED | OUTPATIENT
Start: 2025-03-04 | End: 2025-04-03

## 2025-03-04 RX ORDER — DEXTROAMPHETAMINE SACCHARATE, AMPHETAMINE ASPARTATE MONOHYDRATE, DEXTROAMPHETAMINE SULFATE AND AMPHETAMINE SULFATE 5; 5; 5; 5 MG/1; MG/1; MG/1; MG/1
20 CAPSULE, EXTENDED RELEASE ORAL DAILY
Qty: 30 CAPSULE | Refills: 0 | Status: SHIPPED | OUTPATIENT
Start: 2025-04-03 | End: 2025-05-03

## 2025-03-04 RX ORDER — OXYCODONE AND ACETAMINOPHEN 5; 325 MG/1; MG/1
1 TABLET ORAL EVERY 12 HOURS PRN
Qty: 60 TABLET | Refills: 0 | Status: SHIPPED | OUTPATIENT
Start: 2025-04-03

## 2025-03-04 ASSESSMENT — ANXIETY QUESTIONNAIRES
7. FEELING AFRAID AS IF SOMETHING AWFUL MIGHT HAPPEN: NOT AT ALL
5. BEING SO RESTLESS THAT IT IS HARD TO SIT STILL: NOT AT ALL
2. NOT BEING ABLE TO STOP OR CONTROL WORRYING: NOT AT ALL
GAD7 TOTAL SCORE: 1
6. BECOMING EASILY ANNOYED OR IRRITABLE: SEVERAL DAYS
IF YOU CHECKED OFF ANY PROBLEMS ON THIS QUESTIONNAIRE, HOW DIFFICULT HAVE THESE PROBLEMS MADE IT FOR YOU TO DO YOUR WORK, TAKE CARE OF THINGS AT HOME, OR GET ALONG WITH OTHER PEOPLE: NOT DIFFICULT AT ALL
3. WORRYING TOO MUCH ABOUT DIFFERENT THINGS: NOT AT ALL
GAD7 TOTAL SCORE: 1
7. FEELING AFRAID AS IF SOMETHING AWFUL MIGHT HAPPEN: NOT AT ALL
4. TROUBLE RELAXING: NOT AT ALL
1. FEELING NERVOUS, ANXIOUS, OR ON EDGE: NOT AT ALL
GAD7 TOTAL SCORE: 1
8. IF YOU CHECKED OFF ANY PROBLEMS, HOW DIFFICULT HAVE THESE MADE IT FOR YOU TO DO YOUR WORK, TAKE CARE OF THINGS AT HOME, OR GET ALONG WITH OTHER PEOPLE?: NOT DIFFICULT AT ALL

## 2025-03-04 ASSESSMENT — PAIN SCALES - GENERAL: PAINLEVEL_OUTOF10: MODERATE PAIN (5)

## 2025-03-04 ASSESSMENT — PATIENT HEALTH QUESTIONNAIRE - PHQ9
10. IF YOU CHECKED OFF ANY PROBLEMS, HOW DIFFICULT HAVE THESE PROBLEMS MADE IT FOR YOU TO DO YOUR WORK, TAKE CARE OF THINGS AT HOME, OR GET ALONG WITH OTHER PEOPLE: NOT DIFFICULT AT ALL
SUM OF ALL RESPONSES TO PHQ QUESTIONS 1-9: 1
SUM OF ALL RESPONSES TO PHQ QUESTIONS 1-9: 1

## 2025-03-04 NOTE — PROGRESS NOTES
Assessment & Plan   Problem List Items Addressed This Visit          Digestive    Morbid (severe) obesity due to excess calories (H)    Relevant Medications    amphetamine-dextroamphetamine (ADDERALL XR) 20 MG 24 hr capsule    amphetamine-dextroamphetamine (ADDERALL XR) 20 MG 24 hr capsule (Start on 4/3/2025)    amphetamine-dextroamphetamine (ADDERALL XR) 20 MG 24 hr capsule (Start on 5/3/2025)    methylphenidate (RITALIN) 20 MG tablet    methylphenidate (RITALIN) 20 MG tablet (Start on 4/3/2025)    methylphenidate (RITALIN) 20 MG tablet (Start on 5/3/2025)       Endocrine    Type 2 diabetes mellitus with other specified complication, with long-term current use of insulin (H)    Relevant Orders    Hemoglobin A1c (Completed)    Uric acid (Completed)       Circulatory    Primary hypertension       Behavioral    Attention deficit hyperactivity disorder (ADHD), combined type - Primary    Relevant Medications    amphetamine-dextroamphetamine (ADDERALL XR) 20 MG 24 hr capsule    amphetamine-dextroamphetamine (ADDERALL XR) 20 MG 24 hr capsule (Start on 4/3/2025)    amphetamine-dextroamphetamine (ADDERALL XR) 20 MG 24 hr capsule (Start on 5/3/2025)    methylphenidate (RITALIN) 20 MG tablet    methylphenidate (RITALIN) 20 MG tablet (Start on 4/3/2025)    methylphenidate (RITALIN) 20 MG tablet (Start on 5/3/2025)       Other    Chronic narcotic dependence (H)    Relevant Medications    oxyCODONE-acetaminophen (PERCOCET) 5-325 MG tablet (Start on 5/3/2025)    oxyCODONE-acetaminophen (PERCOCET) 5-325 MG tablet (Start on 4/3/2025)    oxyCODONE-acetaminophen (PERCOCET) 5-325 MG tablet    Continuous opioid dependence (H)    Relevant Medications    oxyCODONE-acetaminophen (PERCOCET) 5-325 MG tablet (Start on 5/3/2025)    oxyCODONE-acetaminophen (PERCOCET) 5-325 MG tablet (Start on 4/3/2025)      [place]  reviewed and as expected.  Urine drug screen and controlled substance agreement are up-to-date.  Refilled oxycodone x 3  months.  He is aware that he needs to be seen for refills.  Refilled ADHD medications including Adderall XR and methylphenidate x 3 months, he has been tolerating this well.  No apparent behavior has been appreciated.  He is aware that he needs to be seen for refills of these medications as well.    Diabetes labs checked, these are stable.  Continue current.    The longitudinal plan of care for the diagnosis(es)/condition(s) as documented were addressed during this visit. Due to the added complexity in care, I will continue to support Joe in the subsequent management and with ongoing continuity of care.      No follow-ups on file.      Monica Diaz is a 79 year old, presenting for the following health issues:  Medication Therapy Management    History of Present Illness       Back Pain:  He presents for follow up of back pain. Patient's back pain is a recurring problem.  Location of back pain:  Right lower back, left lower back, left side of neck, left shoulder, left buttock and right side of waist  Description of back pain: dull ache and gnawing  Back pain spreads: nowhere    Since patient first noticed back pain, pain is: always present, but gets better and worse  Does back pain interfere with his job:  No       Diabetes:   He presents for follow up of diabetes.  He is checking home blood glucose a few times a week.   He checks blood glucose at bedtime.  Blood glucose is sometimes over 200 and never under 70. He is aware of hypoglycemia symptoms including shakiness.   He is concerned about other.   He is having numbness in feet, burning in feet, blurry vision and weight gain.            Hyperlipidemia:  He presents for follow up of hyperlipidemia.   He is taking medication to lower cholesterol. He is having myalgia or other side effects to statin medications.    Hypertension: He presents for follow up of hypertension.  He does not check blood pressure  regularly outside of the clinic. Outside blood  "pressures have been over 140/90. He does not follow a low salt diet.     Vascular Disease:  He presents for follow up of vascular disease.      He takes daily aspirin.    He eats 0-1 servings of fruits and vegetables daily.He consumes 3 sweetened beverage(s) daily.He exercises with enough effort to increase his heart rate 10 to 19 minutes per day.  He exercises with enough effort to increase his heart rate 3 or less days per week. He is missing 2 dose(s) of medications per week.  He is not taking prescribed medications regularly due to remembering to take.          He presents to clinic today with his daughter for med management.  He is needing refills of pain medications as well as medications for ADHD.  He is also wanting to get his diabetes labs updated today.  He is not having any concerns.  Taking medications as prescribed, tolerating well.  He has had issues getting his Adderall filled at times due to shortages.       Objective    BP (!) 150/80   Pulse 68   Temp 97.7  F (36.5  C)   Resp 18   Ht 1.778 m (5' 10\")   Wt 103.9 kg (229 lb)   SpO2 96%   BMI 32.86 kg/m    Body mass index is 32.86 kg/m .  Physical Exam   GENERAL: alert and no distress  EYES: Eyes grossly normal to inspection  RESP: lungs clear to auscultation - no rales, rhonchi or wheezes  CV: regular rate and rhythm, normal S1 S2  NEURO: Normal strength and tone, mentation intact and speech normal  PSYCH: mentation appears normal, affect normal/bright    Results for orders placed or performed in visit on 03/04/25   Hemoglobin A1c     Status: Abnormal   Result Value Ref Range    Estimated Average Glucose 154 (H) <117 mg/dL    Hemoglobin A1C 7.0 (H) <5.7 %   Uric acid     Status: Normal   Result Value Ref Range    Uric Acid 4.7 3.4 - 7.0 mg/dL             Signed Electronically by: SHANA Venegas CNP    "

## 2025-03-04 NOTE — NURSING NOTE
Patient presents today for follow up on chronic pain.    Medication Reconciliation Complete    Kimberli Quintanilla LPN  3/4/2025 1:25 PM

## 2025-03-05 RX ORDER — METHYLPHENIDATE HYDROCHLORIDE 20 MG/1
TABLET ORAL
Qty: 60 TABLET | Refills: 0 | OUTPATIENT
Start: 2025-03-05

## 2025-03-05 NOTE — TELEPHONE ENCOUNTER
METHYLPHENIDATE HCL 20MG TAB   Medicationordered yesterday with future refills available. Unable to complete prescription refill per RNMedication Refill Policy.................... Izabel Kapadia RN ....................  3/5/2025   6:09 AM

## 2025-03-17 DIAGNOSIS — F34.1 DYSTHYMIA: ICD-10-CM

## 2025-03-17 RX ORDER — FLUOXETINE HYDROCHLORIDE 40 MG/1
40 CAPSULE ORAL EVERY MORNING
Qty: 90 CAPSULE | Refills: 0 | OUTPATIENT
Start: 2025-03-17

## 2025-03-17 NOTE — TELEPHONE ENCOUNTER
[id number] of [place] sent Rx request for the following:      Redundant refill request refused: Too soon:  FLUoxetine (PROZAC) 40 MG capsule 90 capsule 0 2/19/2025 -- No   Sig - Route: TAKE 1 CAPSULE (40 MG) BY MOUTH EVERY MORNING. - Oral   Sent to pharmacy as: FLUoxetine HCl 40 MG Oral Capsule (PROzac)   Class: E-Prescribe   Notes to Pharmacy: Patient enrolled in our Rx Med Sync service to improve adherence. We are requesting a refill authorization in advance to ensure an active prescription is on file.   Order: [id number]   E-Prescribing Status: Receipt confirmed by pharmacy (2/19/2025 12:24 PM CST)     Printout Tracking    External Result Report     Pharmacy    [id number] - [place] - [address] Lagunas RN .............. 3/17/2025  10:42 AM

## 2025-03-25 ENCOUNTER — MYC REFILL (OUTPATIENT)
Dept: FAMILY MEDICINE | Facility: OTHER | Age: 80
End: 2025-03-25
Payer: COMMERCIAL

## 2025-03-25 DIAGNOSIS — F34.1 DYSTHYMIA: ICD-10-CM

## 2025-03-25 RX ORDER — FLUOXETINE HYDROCHLORIDE 40 MG/1
40 CAPSULE ORAL EVERY MORNING
Qty: 90 CAPSULE | Refills: 0 | Status: SHIPPED | OUTPATIENT
Start: 2025-03-25

## 2025-03-25 RX ORDER — FLUOXETINE HYDROCHLORIDE 40 MG/1
40 CAPSULE ORAL EVERY MORNING
Qty: 90 CAPSULE | Refills: 0 | OUTPATIENT
Start: 2025-03-25

## 2025-03-25 NOTE — TELEPHONE ENCOUNTER
2/19/25  [facility] Sent in to [facility].    [facility]    Xiao Robles RN on 3/25/2025 at 3:19 PM

## 2025-03-25 NOTE — TELEPHONE ENCOUNTER
Prescription approved per [facility] Refill Protocol.     Refused Prescriptions Disp Refills    FLUoxetine (PROZAC) 40 MG capsule 90 capsule 0     Sig: Take 1 capsule (40 mg) by mouth every morning.       SSRIs Protocol Passed - 3/25/2025  3:22 PM        Passed - PHQ-9 score less than 5 in past 6 months     Please review last PHQ-9 score.       7/22/2024     2:34 PM 3/4/2025     1:07 PM   PHQ-9 SCORE   PHQ-9 Total Score MyChart 3 (Minimal depression) 1 (Minimal depression)   PHQ-9 Total Score 3 1        Patient-reported             Passed - Medication is active on med list and the sig matches. RN to manually verify dose and sig if red X/fail.     If the protocol passes (green check), you do not need to verify med dose and sig.    A prescription matches if they are the same clinical intention.    For Example: once daily and every morning are the same.    The protocol can not identify upper and lower case letters as matching and will fail.     For Example: Take 1 tablet (50 mg) by mouth daily     TAKE 1 TABLET (50 MG) BY MOUTH DAILY    For all fails (red x), verify dose and sig.    If the refill does match what is on file, the RN can still proceed to approve the refill request.       If they do not match, route to the appropriate provider.             Passed - Recent (12 mo) or future (90 days) visit within the authorizing provider's specialty     The patient must have completed an in-person or virtual visit within the past 12 months or has a future visit scheduled within the next 90 days with the authorizing provider s specialty.  Urgent care and e-visits do not qualify as an office visit for this protocol.          Passed - Medication indicated for associated diagnosis     Medication is associated with one or more of the following diagnoses:              Anxiety             Bipolar  Depression  Obsessive-compulsive disorder             Panic disorder  Postmenopausal flushing             Premenstrual dysphoric disorder              Social phobia   Adjustment disorder with depressed mood   Mood disorder   Adjustment disorder with anxious mood          Passed - Patient is age 18 or older             Last Prescription Date:   2/19/2025  Last Fill Qty/Refills:         90, R-0    Last Office Visit:              3/4/2025   Future Office visit:           6/12/2025  Next 5 appointments (look out 90 days)      Jun 12, 2025 1:15 PM  (Arrive by 1:00 PM)  Annual Wellness Visit with SHANA Lynn [facility] ([facility]) [address]  [place] [address]  [phone]           Xiao Robles RN on 3/25/2025 at 3:22 PM

## 2025-04-21 DIAGNOSIS — E78.5 HYPERLIPIDEMIA LDL GOAL <70: ICD-10-CM

## 2025-04-21 DIAGNOSIS — F22 DELUSIONAL DISORDER (H): ICD-10-CM

## 2025-04-22 RX ORDER — ATORVASTATIN CALCIUM 40 MG/1
40 TABLET, FILM COATED ORAL AT BEDTIME
Qty: 90 TABLET | Refills: 0 | Status: SHIPPED | OUTPATIENT
Start: 2025-04-22

## 2025-04-22 NOTE — TELEPHONE ENCOUNTER
[id number] sent Rx request for the following:      Requested Prescriptions   Pending Prescriptions Disp Refills    atorvastatin (LIPITOR) 40 MG tablet [Pharmacy Med Name: ATORVASTATIN 40MG TABLET] 90 tablet 0     Sig: TAKE 1 TABLET BY MOUTH NIGHTLY AT BEDTIME       Antihyperlipidemic agents Failed - 4/22/2025  1:01 PM        Failed - LDL on file in the past 12 months     Last Prescription Date:   12/17/24  Last Fill Qty/Refills:         90, R-0    Last Office Visit:              03/04/25 (Karolina)   Future Office visit:             Next 5 appointments (look out 90 days)      Jun 12, 2025 1:15 PM  (Arrive by 1:00 PM)  Annual Wellness Visit with [facility]NA [facility] ([facility]) [address]  [place] [id number] [phone]           Unable to complete prescription refill per RN Medication Refill Policy.   Carolee Colunga RN on 4/22/2025 at 1:04 PM

## 2025-04-23 RX ORDER — NORTRIPTYLINE HYDROCHLORIDE 10 MG/1
10 CAPSULE ORAL AT BEDTIME
Qty: 90 CAPSULE | Refills: 0 | Status: SHIPPED | OUTPATIENT
Start: 2025-04-23

## 2025-04-23 NOTE — TELEPHONE ENCOUNTER
[id number] sent Rx request for the following:      Requested Prescriptions   Pending Prescriptions Disp Refills    nortriptyline (PAMELOR) 10 MG capsule [Pharmacy Med Name: NORTRIPTYLINE 10MG CAPSULE] 90 capsule 2     Sig: TAKE 1 CAPSULE (10 MG) BY MOUTH AT BEDTIME       Tricyclic Agents Failed - 4/23/2025 11:56 AM        Failed - Medicaiton indicated for associated diagnosis     Medication is associated with one or more of the following diagnoses:    Alcoholism   Anxiety   Attention deficit hyperactivity disorder   Depression   Fibromyalgia   Headache    Insomnia    Neurogenic bladder   Neuropathy   Nocturnal enuresis   Pain   Panic Disorder   Smoking cessation   Tinnitus     Last Prescription Date:   5/24/24  Last Fill Qty/Refills:         90, R-2    Delusional disorder (H) [F22]        Last Office Visit:              3/4/25   Future Office visit:           6/12/25    Unable to complete prescription refill per RN Medication Refill Policy. Routing to covering provider for refill consideration, as PCP/provider is out of clinic >48 hours or Pt is completely out of medication and provider is out of the clinic today.Hue Lagunas, Refill RN .............. 4/23/2025  11:57 AM

## 2025-05-07 ENCOUNTER — RESULTS FOLLOW-UP (OUTPATIENT)
Dept: INTERNAL MEDICINE | Facility: OTHER | Age: 80
End: 2025-05-07

## 2025-05-07 ENCOUNTER — OFFICE VISIT (OUTPATIENT)
Dept: INTERNAL MEDICINE | Facility: OTHER | Age: 80
End: 2025-05-07
Payer: MEDICARE

## 2025-05-07 ENCOUNTER — HOSPITAL ENCOUNTER (OUTPATIENT)
Dept: GENERAL RADIOLOGY | Facility: OTHER | Age: 80
Discharge: HOME | End: 2025-05-07
Payer: MEDICARE

## 2025-05-07 VITALS
WEIGHT: 229 LBS | BODY MASS INDEX: 32.86 KG/M2 | HEART RATE: 66 BPM | DIASTOLIC BLOOD PRESSURE: 80 MMHG | SYSTOLIC BLOOD PRESSURE: 142 MMHG | TEMPERATURE: 97.4 F | RESPIRATION RATE: 16 BRPM | OXYGEN SATURATION: 97 %

## 2025-05-07 DIAGNOSIS — J01.90 ACUTE SINUSITIS WITH SYMPTOMS > 10 DAYS: Primary | ICD-10-CM

## 2025-05-07 DIAGNOSIS — R05.1 ACUTE COUGH: ICD-10-CM

## 2025-05-07 DIAGNOSIS — H61.22 IMPACTED CERUMEN OF LEFT EAR: ICD-10-CM

## 2025-05-07 PROCEDURE — 71046 X-RAY EXAM CHEST 2 VIEWS: CPT | Mod: 26 | Performed by: RADIOLOGY

## 2025-05-07 PROCEDURE — G0463 HOSPITAL OUTPT CLINIC VISIT: HCPCS | Mod: 25

## 2025-05-07 PROCEDURE — 69210 REMOVE IMPACTED EAR WAX UNI: CPT

## 2025-05-07 PROCEDURE — 71046 X-RAY EXAM CHEST 2 VIEWS: CPT

## 2025-05-07 RX ORDER — DOXYCYCLINE 100 MG/1
100 CAPSULE ORAL 2 TIMES DAILY
Qty: 14 CAPSULE | Refills: 0 | Status: SHIPPED | OUTPATIENT
Start: 2025-05-07 | End: 2025-05-14

## 2025-05-07 ASSESSMENT — ENCOUNTER SYMPTOMS
SINUS PAIN: 1
FEVER: 1
SINUS PRESSURE: 1
CHILLS: 1
SORE THROAT: 0

## 2025-05-07 ASSESSMENT — PAIN SCALES - GENERAL: PAINLEVEL_OUTOF10: NO PAIN (0)

## 2025-05-07 NOTE — PATIENT INSTRUCTIONS
Start doxycycline 100 mg tablet -- take twice daily (AM and PM) after meals.   Avoid dairy products within 1 to 2 hours (before or after) taking antibiotic -- as it will grab the antibiotic and not let it work.   - Stop vitamins and supplements while taking doxycycline  -Avoid sunlight- wear sunscreen and hat while on this medication- it increases your likelihood of severe sunburn      -Chest x-ray today to rule out pneumonia

## 2025-05-07 NOTE — NURSING NOTE
"Chief Complaint   Patient presents with    RECHECK     Ear infection    Patient presents to the clinic today for a possible ear infection.  Patient and his daughter both state that patient has been having trouble hearing for at least the past couple of weeks. She stated that there has been some drainage. Patient has no pain.   Initial There were no vitals taken for this visit. Estimated body mass index is 32.86 kg/m  as calculated from the following:    Height as of 3/4/25: 1.778 m (5' 10\").    Weight as of 3/4/25: 103.9 kg (229 lb).  Meds Reconciled: complete      Gabbi De Anda LPN,LPN on 5/7/2025 at 2:40 PM  Ext. [phone]        Gabbi De Anda LPN  "

## 2025-05-07 NOTE — PROGRESS NOTES
"  Assessment & Plan     ICD-10-CM    1. Acute sinusitis with symptoms > 10 days  J01.90 doxycycline hyclate (VIBRAMYCIN) 100 MG capsule      2. Acute cough  R05.1 XR Chest 2 Views      3. Impacted cerumen of left ear  H61.22 REMOVE IMPACTED CERUMEN         Left ear impacted cerumen: Left ear appeared severely impacted- likely contributing to his symptoms.  Ear flush was provided by nurse today which successfully removed the cerumen.  No evidence of bilateral ear infection.  He also has an audiology appointment tomorrow to assess his hearing/Hearing aid check.    Acute sinusitis: Due to 2-week history of sinus pain/pressure we will treat him with a 7-day course of doxycycline.  We did proceed with a chest x-ray today due to his cough and recent low-grade fevers does not show any evidence of pneumonia.    Instructed to return if symptoms worsen or do not improve was given to patient and daughter.  They will follow-up as needed.      The longitudinal plan of care for the diagnosis(es)/condition(s) as documented were addressed during this visit. Due to the added complexity in care, I will continue to support Joe in the subsequent management and with ongoing continuity of care.                 BMI  Estimated body mass index is 32.86 kg/m  as calculated from the following:    Height as of 3/4/25: 1.778 m (5' 10\").    Weight as of this encounter: 103.9 kg (229 lb).           No follow-ups on file.      SHANA Young [facility]    Review of Systems   Constitutional:  Positive for chills and fever (low grade temps).   HENT:  Positive for congestion, ear pain (Bilateral), sinus pressure and sinus pain. Negative for sore throat.    Respiratory:  Positive for cough. Negative for shortness of breath.    Cardiovascular:  Negative for chest pain.       Subjective   Joe is a 79 year old, presenting for the following health issues:  RECHECK (Ear infection )    Patient presents to clinic with " daughter with a concern of possible ear infection.  Patient reports that he has had bilateral ear pain, fullness and sinus pressure for 2 weeks.  He has had several days with low-grade fevers.  He also reports having a mild cough.  Denies any shortness of breath or any other symptoms.    History of Present Illness       Reason for visit:  Ears     hearing  Symptom onset:  1-2 weeks ago  Symptoms include:  Hard to hear  Symptom intensity:  Moderate  Symptom progression:  Staying the same  Had these symptoms before:  No He is missing 1 dose(s) of medications per week.  He is not taking prescribed medications regularly due to remembering to take.                      Objective    BP (!) 142/80 (BP Location: Right arm, Patient Position: Sitting, Cuff Size: Adult Large)   Pulse 66   Temp 97.4  F (36.3  C) (Temporal)   Resp 16   Wt 103.9 kg (229 lb)   SpO2 97%   BMI 32.86 kg/m    Body mass index is 32.86 kg/m .  Physical Exam  Vitals reviewed.   HENT:      Right Ear: Tympanic membrane, ear canal and external ear normal. There is no impacted cerumen.      Left Ear: Tympanic membrane and ear canal normal. There is impacted cerumen.      Nose:      Right Sinus: Maxillary sinus tenderness and frontal sinus tenderness present.      Left Sinus: Maxillary sinus tenderness and frontal sinus tenderness present.   Eyes:      General:         Right eye: No discharge.         Left eye: No discharge.      Pupils: Pupils are equal, round, and reactive to light.   Cardiovascular:      Rate and Rhythm: Normal rate and regular rhythm.   Pulmonary:      Effort: Pulmonary effort is normal.      Breath sounds: Normal breath sounds. Decreased air movement present.   Neurological:      Mental Status: He is alert.        Results for orders placed or performed during the hospital encounter of 05/07/25   XR Chest 2 Views     Status: None    Narrative    Procedure:XR CHEST 2 VIEWS    Clinical history:Male, 79 years, Acute cough    Technique:  Two views are submitted.    Comparison: 8/4/2023    Findings: The cardiac silhouette is within normal limits.    The lungs are clear. Bony structures are unremarkable.      Impression    Impression:   No acute abnormality. No evidence of acute or active cardiopulmonary  disease.    LUCILA BYERS MD         SYSTEM ID:  [id number]                   Signed Electronically by: SHANA Young CNP

## 2025-05-08 ASSESSMENT — ENCOUNTER SYMPTOMS
COUGH: 1
SHORTNESS OF BREATH: 0

## 2025-05-11 DIAGNOSIS — Z95.5 HISTORY OF CORONARY ARTERY STENT PLACEMENT: ICD-10-CM

## 2025-05-11 DIAGNOSIS — I10 PRIMARY HYPERTENSION: ICD-10-CM

## 2025-05-11 DIAGNOSIS — G20.A2 PARKINSON'S DISEASE WITH FLUCTUATING MANIFESTATIONS, UNSPECIFIED WHETHER DYSKINESIA PRESENT (H): ICD-10-CM

## 2025-05-11 DIAGNOSIS — I25.118 CORONARY ARTERY DISEASE OF NATIVE ARTERY OF NATIVE HEART WITH STABLE ANGINA PECTORIS: ICD-10-CM

## 2025-05-12 ENCOUNTER — TELEPHONE (OUTPATIENT)
Dept: PHARMACY | Facility: OTHER | Age: 80
End: 2025-05-12
Payer: COMMERCIAL

## 2025-05-12 RX ORDER — CARVEDILOL 6.25 MG/1
6.25 TABLET ORAL 2 TIMES DAILY WITH MEALS
Qty: 180 TABLET | Refills: 0 | Status: SHIPPED | OUTPATIENT
Start: 2025-05-12

## 2025-05-12 NOTE — TELEPHONE ENCOUNTER
[facility] Recruitment: [facility]  insurance     Referral outreach attempt #1 on May 12, 2025      Outcome: left voicemail- Call back number [phone] and MyChart message sent    [facility]

## 2025-05-12 NOTE — TELEPHONE ENCOUNTER
Requested Prescriptions   Pending Prescriptions Disp Refills    carvedilol (COREG) 6.25 MG tablet [Pharmacy Med Name: CARVEDILOL 6.25MG TABLET] 180 tablet 3     Sig: TAKE 1 TABLET (6.25 MG) BY MOUTH 2 TIMES DAILY (WITH MEALS)       Beta-Blockers Protocol Failed - 5/12/2025 11:47 AM        Failed - Most recent blood pressure under 140/90 in past 12 months     BP Readings from Last 3 Encounters:   05/07/25 (!) 142/80   03/04/25 (!) 146/78   12/05/24 (!) 146/82   No data recorded        Passed - Patient is age 6 or older        Passed - Medication is active on med list and the sig matches. RN to manually verify dose and sig if red X/fail.     If the protocol passes (green check), you do not need to verify med dose and sig.    A prescription matches if they are the same clinical intention.    For Example: once daily and every morning are the same.    The protocol can not identify upper and lower case letters as matching and will fail.     For Example: Take 1 tablet (50 mg) by mouth daily     TAKE 1 TABLET (50 MG) BY MOUTH DAILY    For all fails (red x), verify dose and sig.    If the refill does match what is on file, the RN can still proceed to approve the refill request.     If they do not match, route to the appropriate provider.        Passed - Medication indicated for associated diagnosis     Medication is associated with one or more of the following diagnoses:     Hypertension (HTN)   Atrial fibrillation/flutter   Angina   ASCVD   Migraine   Heart Failure   Tremor   Anxiety   Ocular hypertension   Glaucoma   PTSD   Obstructive hypertrophic cardiomyopathy   History of myocarditis   Palpitations   POTS (postural orthostatic tachycardia syndrome)   SVT (supraventricular tachycardia)   Hyperthyroidism   Tachycardia   Acute non-st segment elevation myocardial infarction   Subsequent non-st segment elevation myocardial infarction   Ischemic myocardial dysfunction        Passed - Recent (12 mo) or future (90 days) visit  within the authorizing provider's specialty     The patient must have completed an in-person or virtual visit within the past 12 months or has a future visit scheduled within the next 90 days with the authorizing provider s specialty.  Urgent care and e-visits do not qualify as an office visit for this protocol.     Last Written Prescription Date:  7/22/24  Last Fill Quantity: 180,   # refills: 3  Last Office Visit: 7/22/24  Future Office visit:    Next 5 appointments (look out 90 days)      Jun 12, 2025 1:15 PM  (Arrive by 1:00 PM)  Annual Wellness Visit with SHANA [facility] ([facility]) [address]  [place] [address]  [phone]     Routing refill request to provider for review/approval because:  Blood pressure out of range     Unable to complete prescription refill per RN Medication Refill Policy.   Dawna Koenig RN ....................  5/12/2025   11:48 AM

## 2025-05-13 RX ORDER — ROPINIROLE 2 MG/1
2 TABLET, FILM COATED ORAL AT BEDTIME
Qty: 90 TABLET | Refills: 0 | Status: SHIPPED | OUTPATIENT
Start: 2025-05-13

## 2025-05-13 NOTE — TELEPHONE ENCOUNTER
[facility] sent Rx request for the following:      Requested Prescriptions   Pending Prescriptions Disp Refills    rOPINIRole (REQUIP) 2 MG tablet [Pharmacy Med Name: ROPINIROLE 2MG TABLET] 90 tablet 2     Sig: TAKE 1 TABLET (2 MG) BY MOUTH AT BEDTIME          Last Prescription Date:   5/24/24  Last Fill Qty/Refills:         90, R-2    Last Office Visit:              2/29/24   Future Office visit:             Next 5 appointments (look out 90 days)      Jun 12, 2025 1:15 PM  (Arrive by 1:00 PM)  Annual Wellness Visit with SHANA [facility] ([facility]) [address]  [place] [address] [phone]           Unable to complete prescription refill per RN Medication Refill Policy. Routing to provider for refill consideration  Annetta Padilla RN on 5/13/2025 at 2:07 PM

## 2025-05-15 DIAGNOSIS — Z00.00 HEALTH CARE MAINTENANCE: ICD-10-CM

## 2025-05-15 DIAGNOSIS — I10 PRIMARY HYPERTENSION: ICD-10-CM

## 2025-05-15 DIAGNOSIS — K21.9 GASTROESOPHAGEAL REFLUX DISEASE WITHOUT ESOPHAGITIS: ICD-10-CM

## 2025-05-15 DIAGNOSIS — K59.1 FUNCTIONAL DIARRHEA: ICD-10-CM

## 2025-05-15 DIAGNOSIS — E03.9 HYPOTHYROIDISM, UNSPECIFIED TYPE: ICD-10-CM

## 2025-05-16 NOTE — TELEPHONE ENCOUNTER
[id number] sent Rx request for the following:      Requested Prescriptions   Pending Prescriptions Disp Refills    amLODIPine (NORVASC) 10 MG tablet [Pharmacy Med Name: AMLODIPINE 10MG TABLET] 90 tablet 0     Sig: TAKE 1 TABLET (10 MG) BY MOUTH DAILY       Calcium Channel Blockers Protocol  Failed - 5/16/2025  1:02 PM        Failed - Most recent blood pressure under 140/90 in past 12 months     BP Readings from Last 3 Encounters:   05/07/25 (!) 142/80   03/04/25 (!) 146/78   12/05/24 (!) 146/82   No data recorded        Failed - GFR is on file in the past 12 months and most recent GFR is normal        Last Prescription Date:   12/24/2024  Last Fill Qty/Refills:         90, R-0    Last Office Visit:              3/4/2025   Future Office visit:           5/30/2025    Unable to complete prescription refill per RN Medication Refill Policy.   Will sent to pcp to rview and approve ... Will armani up for the year.   Mic Marroquin RN on 5/16/2025 at 1:05 PM

## 2025-05-16 NOTE — TELEPHONE ENCOUNTER
[id number] sent Rx request for the following:      Requested Prescriptions   Pending Prescriptions Disp Refills    magnesium oxide (MAG-OX) 400 MG tablet [Pharmacy Med Name: MAGNESIUM OXIDE 400MG TABLET] 90 tablet 0     Sig: TAKE 1 TABLET BY MOUTH DAILY WITH FOOD   Last Prescription Date:   1/10/25  Last Fill Qty/Refills:         90, R-0      There is no refill protocol information for this order       loperamide (IMODIUM) 2 MG capsule [Pharmacy Med Name: LOPERAMIDE 2MG CAPSULE] 270 capsule 0     Sig: TAKE 2 CAPSULES IN THE MORNING AND 1 CAPSULE IN THE EVENING   Last Prescription Date:   2/19/25  Last Fill Qty/Refills:         270, R-0      There is no refill protocol information for this order       levothyroxine (SYNTHROID/LEVOTHROID) 200 MCG tablet [Pharmacy Med Name: LEVOTHYROXINE 200MCG TABLET] 90 tablet 0     Sig: TAKE 1 TABLET (200 MCG) BY MOUTH BEFORE BREAKFAST.   Last Prescription Date:   2/19/25  Last Fill Qty/Refills:         90, R-0        pantoprazole (PROTONIX) 20 MG EC tablet [Pharmacy Med Name: PANTOPRAZOLE 20MG DR TABLET] 90 tablet 0     Sig: TAKE 1 TABLET (20 MG) BY MOUTH DAILY   Last Prescription Date:   2/19/25  Last Fill Qty/Refills:         90, R-0        losartan (COZAAR) 100 MG tablet [Pharmacy Med Name: LOSARTAN 100MG TABLET] 90 tablet 0     Sig: TAKE 1 TABLET (100 MG) BY MOUTH DAILY   Last Prescription Date:   2/19/25  Last Fill Qty/Refills:         90, R-0      Angiotensin-II Receptors Failed - 5/16/2025 11:41 AM        Failed - Most recent blood pressure under 140/90 in past 12 months     BP Readings from Last 3 Encounters:   05/07/25 (!) 142/80   03/04/25 (!) 146/78   12/05/24 (!) 146/82   No data recorded        Failed - Has GFR on file in past 12 months and most recent value is normal     Last Office Visit:              3/4/25   Future Office visit:           5/30/25    Unable to complete prescription refill per RN Medication Refill Policy. Hue  Terrell Lagunas RN .............. 5/16/2025  11:45 AM

## 2025-05-19 ENCOUNTER — TELEPHONE (OUTPATIENT)
Dept: PHARMACY | Facility: OTHER | Age: 80
End: 2025-05-19
Payer: COMMERCIAL

## 2025-05-19 RX ORDER — LEVOTHYROXINE SODIUM 200 UG/1
TABLET ORAL
Qty: 90 TABLET | Refills: 2 | Status: SHIPPED | OUTPATIENT
Start: 2025-05-19

## 2025-05-19 RX ORDER — LOPERAMIDE HYDROCHLORIDE 2 MG/1
CAPSULE ORAL
Qty: 270 CAPSULE | Refills: 2 | Status: SHIPPED | OUTPATIENT
Start: 2025-05-19

## 2025-05-19 RX ORDER — AMLODIPINE BESYLATE 10 MG/1
10 TABLET ORAL DAILY
Qty: 90 TABLET | Refills: 2 | Status: SHIPPED | OUTPATIENT
Start: 2025-05-19

## 2025-05-19 RX ORDER — LOSARTAN POTASSIUM 100 MG/1
100 TABLET ORAL DAILY
Qty: 90 TABLET | Refills: 2 | Status: SHIPPED | OUTPATIENT
Start: 2025-05-19

## 2025-05-19 RX ORDER — PANTOPRAZOLE SODIUM 20 MG/1
20 TABLET, DELAYED RELEASE ORAL DAILY
Qty: 90 TABLET | Refills: 2 | Status: SHIPPED | OUTPATIENT
Start: 2025-05-19

## 2025-05-19 RX ORDER — MAGNESIUM OXIDE 400 MG/1
400 TABLET ORAL
Qty: 90 TABLET | Refills: 2 | Status: SHIPPED | OUTPATIENT
Start: 2025-05-19

## 2025-05-19 NOTE — TELEPHONE ENCOUNTER
[facility] Recruitment: [facility]  insurance     Referral outreach attempt #2 on May 19, 2025      Outcome: left voicemail- Call back number [phone] and MyChart message sent    [facility]

## 2025-05-30 ENCOUNTER — HOSPITAL ENCOUNTER (OUTPATIENT)
Dept: GENERAL RADIOLOGY | Facility: OTHER | Age: 80
Discharge: HOME | End: 2025-05-30
Attending: NURSE PRACTITIONER
Payer: MEDICARE

## 2025-05-30 ENCOUNTER — OFFICE VISIT (OUTPATIENT)
Dept: FAMILY MEDICINE | Facility: OTHER | Age: 80
End: 2025-05-30
Attending: NURSE PRACTITIONER
Payer: MEDICARE

## 2025-05-30 VITALS
RESPIRATION RATE: 18 BRPM | SYSTOLIC BLOOD PRESSURE: 130 MMHG | HEART RATE: 64 BPM | TEMPERATURE: 97 F | WEIGHT: 225.4 LBS | OXYGEN SATURATION: 95 % | BODY MASS INDEX: 32.27 KG/M2 | DIASTOLIC BLOOD PRESSURE: 70 MMHG | HEIGHT: 70 IN

## 2025-05-30 DIAGNOSIS — E11.69 TYPE 2 DIABETES MELLITUS WITH OTHER SPECIFIED COMPLICATION, WITH LONG-TERM CURRENT USE OF INSULIN (H): ICD-10-CM

## 2025-05-30 DIAGNOSIS — Z79.4 TYPE 2 DIABETES MELLITUS WITH OTHER SPECIFIED COMPLICATION, WITH LONG-TERM CURRENT USE OF INSULIN (H): ICD-10-CM

## 2025-05-30 DIAGNOSIS — F90.2 ATTENTION DEFICIT HYPERACTIVITY DISORDER (ADHD), COMBINED TYPE: ICD-10-CM

## 2025-05-30 DIAGNOSIS — F11.20 CHRONIC NARCOTIC DEPENDENCE (H): ICD-10-CM

## 2025-05-30 DIAGNOSIS — R10.9 FLANK PAIN: ICD-10-CM

## 2025-05-30 DIAGNOSIS — F11.20 CONTINUOUS OPIOID DEPENDENCE (H): ICD-10-CM

## 2025-05-30 DIAGNOSIS — M25.562 ACUTE PAIN OF LEFT KNEE: ICD-10-CM

## 2025-05-30 DIAGNOSIS — M25.562 ACUTE PAIN OF LEFT KNEE: Primary | ICD-10-CM

## 2025-05-30 LAB
ALBUMIN UR-MCNC: NEGATIVE MG/DL
APPEARANCE UR: CLEAR
BILIRUB UR QL STRIP: NEGATIVE
COLOR UR AUTO: NORMAL
CREAT UR-MCNC: 97 MG/DL
EST. AVERAGE GLUCOSE BLD GHB EST-MCNC: 160 MG/DL
GLUCOSE UR STRIP-MCNC: NEGATIVE MG/DL
HBA1C MFR BLD: 7.2 %
HGB UR QL STRIP: NEGATIVE
KETONES UR STRIP-MCNC: NEGATIVE MG/DL
LEUKOCYTE ESTERASE UR QL STRIP: NEGATIVE
MICROALBUMIN UR-MCNC: 21.8 MG/L
MICROALBUMIN/CREAT UR: 22.47 MG/G CR (ref 0–17)
NITRATE UR QL: NEGATIVE
PH UR STRIP: 5.5 [PH] (ref 5–9)
SP GR UR STRIP: 1.01 (ref 1–1.03)
UROBILINOGEN UR STRIP-MCNC: NORMAL MG/DL

## 2025-05-30 PROCEDURE — 82043 UR ALBUMIN QUANTITATIVE: CPT | Mod: ZL | Performed by: NURSE PRACTITIONER

## 2025-05-30 PROCEDURE — 3078F DIAST BP <80 MM HG: CPT | Performed by: NURSE PRACTITIONER

## 2025-05-30 PROCEDURE — 83036 HEMOGLOBIN GLYCOSYLATED A1C: CPT | Mod: ZL | Performed by: NURSE PRACTITIONER

## 2025-05-30 PROCEDURE — 73560 X-RAY EXAM OF KNEE 1 OR 2: CPT | Mod: LT

## 2025-05-30 PROCEDURE — G0463 HOSPITAL OUTPT CLINIC VISIT: HCPCS | Mod: 25

## 2025-05-30 PROCEDURE — G0463 HOSPITAL OUTPT CLINIC VISIT: HCPCS

## 2025-05-30 PROCEDURE — 81003 URINALYSIS AUTO W/O SCOPE: CPT | Mod: ZL | Performed by: NURSE PRACTITIONER

## 2025-05-30 PROCEDURE — 3075F SYST BP GE 130 - 139MM HG: CPT | Performed by: NURSE PRACTITIONER

## 2025-05-30 PROCEDURE — 73560 X-RAY EXAM OF KNEE 1 OR 2: CPT | Mod: 26 | Performed by: RADIOLOGY

## 2025-05-30 PROCEDURE — G2211 COMPLEX E/M VISIT ADD ON: HCPCS | Performed by: NURSE PRACTITIONER

## 2025-05-30 PROCEDURE — 3051F HG A1C>EQUAL 7.0%<8.0%: CPT | Performed by: NURSE PRACTITIONER

## 2025-05-30 PROCEDURE — 36415 COLL VENOUS BLD VENIPUNCTURE: CPT | Mod: ZL | Performed by: NURSE PRACTITIONER

## 2025-05-30 PROCEDURE — 99214 OFFICE O/P EST MOD 30 MIN: CPT | Performed by: NURSE PRACTITIONER

## 2025-05-30 RX ORDER — DULAGLUTIDE 1.5 MG/.5ML
1.5 INJECTION, SOLUTION SUBCUTANEOUS
Qty: 6 ML | Refills: 1 | Status: SHIPPED | OUTPATIENT
Start: 2025-05-30

## 2025-05-30 RX ORDER — OXYCODONE AND ACETAMINOPHEN 5; 325 MG/1; MG/1
1 TABLET ORAL EVERY 12 HOURS PRN
Qty: 60 TABLET | Refills: 0 | Status: SHIPPED | OUTPATIENT
Start: 2025-05-30

## 2025-05-30 RX ORDER — DEXTROAMPHETAMINE SACCHARATE, AMPHETAMINE ASPARTATE MONOHYDRATE, DEXTROAMPHETAMINE SULFATE AND AMPHETAMINE SULFATE 5; 5; 5; 5 MG/1; MG/1; MG/1; MG/1
20 CAPSULE, EXTENDED RELEASE ORAL DAILY
Qty: 30 CAPSULE | Refills: 0 | Status: SHIPPED | OUTPATIENT
Start: 2025-06-29 | End: 2025-07-29

## 2025-05-30 RX ORDER — METHYLPHENIDATE HYDROCHLORIDE 20 MG/1
20 TABLET ORAL DAILY
Qty: 30 TABLET | Refills: 0 | Status: SHIPPED | OUTPATIENT
Start: 2025-05-30 | End: 2025-06-29

## 2025-05-30 RX ORDER — DEXTROAMPHETAMINE SACCHARATE, AMPHETAMINE ASPARTATE MONOHYDRATE, DEXTROAMPHETAMINE SULFATE AND AMPHETAMINE SULFATE 5; 5; 5; 5 MG/1; MG/1; MG/1; MG/1
20 CAPSULE, EXTENDED RELEASE ORAL DAILY
Qty: 30 CAPSULE | Refills: 0 | Status: SHIPPED | OUTPATIENT
Start: 2025-05-30 | End: 2025-06-29

## 2025-05-30 RX ORDER — METHYLPHENIDATE HYDROCHLORIDE 20 MG/1
20 TABLET ORAL DAILY
Qty: 30 TABLET | Refills: 0 | Status: SHIPPED | OUTPATIENT
Start: 2025-07-29 | End: 2025-08-28

## 2025-05-30 RX ORDER — METHYLPHENIDATE HYDROCHLORIDE 20 MG/1
20 TABLET ORAL DAILY
Qty: 30 TABLET | Refills: 0 | Status: SHIPPED | OUTPATIENT
Start: 2025-06-29 | End: 2025-07-29

## 2025-05-30 RX ORDER — DEXTROAMPHETAMINE SACCHARATE, AMPHETAMINE ASPARTATE MONOHYDRATE, DEXTROAMPHETAMINE SULFATE AND AMPHETAMINE SULFATE 5; 5; 5; 5 MG/1; MG/1; MG/1; MG/1
20 CAPSULE, EXTENDED RELEASE ORAL DAILY
Qty: 30 CAPSULE | Refills: 0 | Status: SHIPPED | OUTPATIENT
Start: 2025-07-29 | End: 2025-08-28

## 2025-05-30 ASSESSMENT — ANXIETY QUESTIONNAIRES
1. FEELING NERVOUS, ANXIOUS, OR ON EDGE: NOT AT ALL
4. TROUBLE RELAXING: NOT AT ALL
7. FEELING AFRAID AS IF SOMETHING AWFUL MIGHT HAPPEN: NOT AT ALL
GAD7 TOTAL SCORE: 1
GAD7 TOTAL SCORE: 1
8. IF YOU CHECKED OFF ANY PROBLEMS, HOW DIFFICULT HAVE THESE MADE IT FOR YOU TO DO YOUR WORK, TAKE CARE OF THINGS AT HOME, OR GET ALONG WITH OTHER PEOPLE?: NOT DIFFICULT AT ALL
6. BECOMING EASILY ANNOYED OR IRRITABLE: SEVERAL DAYS
7. FEELING AFRAID AS IF SOMETHING AWFUL MIGHT HAPPEN: NOT AT ALL
IF YOU CHECKED OFF ANY PROBLEMS ON THIS QUESTIONNAIRE, HOW DIFFICULT HAVE THESE PROBLEMS MADE IT FOR YOU TO DO YOUR WORK, TAKE CARE OF THINGS AT HOME, OR GET ALONG WITH OTHER PEOPLE: NOT DIFFICULT AT ALL
2. NOT BEING ABLE TO STOP OR CONTROL WORRYING: NOT AT ALL
GAD7 TOTAL SCORE: 1
3. WORRYING TOO MUCH ABOUT DIFFERENT THINGS: NOT AT ALL
5. BEING SO RESTLESS THAT IT IS HARD TO SIT STILL: NOT AT ALL

## 2025-05-30 ASSESSMENT — PAIN SCALES - PAIN ENJOYMENT GENERAL ACTIVITY SCALE (PEG)
AVG_PAIN_PASTWEEK: 7
AVG_PAIN_PASTWEEK: 7
PEG_TOTALSCORE: 7
PEG_TOTALSCORE: 7
INTERFERED_GENERAL_ACTIVITY: 7
INTERFERED_GENERAL_ACTIVITY: 7
INTERFERED_ENJOYMENT_LIFE: 7
INTERFERED_ENJOYMENT_LIFE: 7

## 2025-05-30 NOTE — NURSING NOTE
"Chief Complaint   Patient presents with    Diabetes     Diabetic Check     Recheck Medication     Med Check & Chronic Pain Management     Knee Pain     Left Knee Pain     Urinary Problem     Flank/Side Pain, Urine Concerns x 2 Weeks        Initial /70 (BP Location: Right arm, Patient Position: Chair, Cuff Size: Adult Large)   Pulse 64   Temp 97  F (36.1  C) (Temporal)   Resp 18   Ht 1.778 m (5' 10\")   Wt 102.2 kg (225 lb 6.4 oz)   SpO2 95%   BMI 32.34 kg/m   Estimated body mass index is 32.34 kg/m  as calculated from the following:    Height as of this encounter: 1.778 m (5' 10\").    Weight as of this encounter: 102.2 kg (225 lb 6.4 oz).      Medication Reconciliation: Complete.       Sharyn Rose LPN on 5/30/2025 at 1:21 PM     "

## 2025-05-30 NOTE — PROGRESS NOTES
Assessment & Plan   Problem List Items Addressed This Visit          Endocrine    Type 2 diabetes mellitus with other specified complication, with long-term current use of insulin (H)    Relevant Medications    dulaglutide (TRULICITY) 1.5 MG/0.5ML pen    CONTOUR NEXT TEST test strip    Other Relevant Orders    Hemoglobin A1c    Albumin Random Urine Quantitative with Creat Ratio       Behavioral    Chronic narcotic dependence (H)    Relevant Medications    oxyCODONE-acetaminophen (PERCOCET) 5-325 MG tablet    oxyCODONE-acetaminophen (PERCOCET) 5-325 MG tablet    oxyCODONE-acetaminophen (PERCOCET) 5-325 MG tablet    amphetamine-dextroamphetamine (ADDERALL XR) 20 MG 24 hr capsule    amphetamine-dextroamphetamine (ADDERALL XR) 20 MG 24 hr capsule (Start on 6/29/2025)    amphetamine-dextroamphetamine (ADDERALL XR) 20 MG 24 hr capsule (Start on 7/29/2025)    methylphenidate (RITALIN) 20 MG tablet    methylphenidate (RITALIN) 20 MG tablet (Start on 6/29/2025)    methylphenidate (RITALIN) 20 MG tablet (Start on 7/29/2025)    Attention deficit hyperactivity disorder (ADHD), combined type    Relevant Medications    amphetamine-dextroamphetamine (ADDERALL XR) 20 MG 24 hr capsule    amphetamine-dextroamphetamine (ADDERALL XR) 20 MG 24 hr capsule (Start on 6/29/2025)    amphetamine-dextroamphetamine (ADDERALL XR) 20 MG 24 hr capsule (Start on 7/29/2025)    methylphenidate (RITALIN) 20 MG tablet    methylphenidate (RITALIN) 20 MG tablet (Start on 6/29/2025)    methylphenidate (RITALIN) 20 MG tablet (Start on 7/29/2025)    Continuous opioid dependence (H)    Relevant Medications    oxyCODONE-acetaminophen (PERCOCET) 5-325 MG tablet    oxyCODONE-acetaminophen (PERCOCET) 5-325 MG tablet    amphetamine-dextroamphetamine (ADDERALL XR) 20 MG 24 hr capsule    amphetamine-dextroamphetamine (ADDERALL XR) 20 MG 24 hr capsule (Start on 6/29/2025)    amphetamine-dextroamphetamine (ADDERALL XR) 20 MG 24 hr capsule (Start on 7/29/2025)     methylphenidate (RITALIN) 20 MG tablet    methylphenidate (RITALIN) 20 MG tablet (Start on 6/29/2025)    methylphenidate (RITALIN) 20 MG tablet (Start on 7/29/2025)     Other Visit Diagnoses         Acute pain of left knee    -  Primary    Relevant Medications    oxyCODONE-acetaminophen (PERCOCET) 5-325 MG tablet    oxyCODONE-acetaminophen (PERCOCET) 5-325 MG tablet    oxyCODONE-acetaminophen (PERCOCET) 5-325 MG tablet    Other Relevant Orders    XR Knee Left 1/2 Views      Flank pain        Relevant Orders    UA Macroscopic with reflex to Microscopic and Culture           Discussed need to reduce and eventually discontinue stimulants given age and heart history.  At this time I have agreed to continue with Adderall XR.  Immediate release Ritalin to once daily.  At his next visit we will discontinue Ritalin.  [facility] reviewed and as expected.  Refills provided.  Oxycodone refilled x 3 months as well.  X-ray of left knee due to left knee pain, discussed possible injections, he is not interested in this currently  Flank pain, daughter reports frothy urine, urinalysis and microalbumin updated  A1c updated, refill Trulicity and Contour test strips  At next visit we will plan on completing annual wellness exam and refill of all medications    The longitudinal plan of care for the diagnosis(es)/condition(s) as documented were addressed during this visit. Due to the added complexity in care, I will continue to support Joe in the subsequent management and with ongoing continuity of care.    Monica Diaz is a 79 year old, presenting for the following health issues:  Diabetes (Diabetic Check ), Recheck Medication (Med Check & Chronic Pain Management ), Knee Pain (Left Knee Pain ), and Urinary Problem (Flank/Side Pain, Urine Concerns x 2 Weeks )      5/30/2025     1:02 PM   Additional Questions   Roomed by Sharyn BASURTO LPN   Accompanied by Daughter     Knee Pain    History of Present Illness       Back Pain:  He  presents for follow up of back pain. Patient's back pain is a recurring problem.  Location of back pain:  Right lower back, right middle of back, left side of neck, left shoulder and right side of waist  Description of back pain: sharp  Back pain spreads: nowhere    Since patient first noticed back pain, pain is: gradually worsening  Does back pain interfere with his job:  Yes       Reason for visit:  Follow up and my left knee hurts real bad and lower back bothering me more  Symptom onset:  1-2 weeks ago  Symptoms include:  Pain left knee back space  Symptom intensity:  Severe  Symptom progression:  Staying the same  Had these symptoms before:  No  What makes it worse:  Back sometimes  What makes it better:  Pain meds He is missing 1 dose(s) of medications per week.  He is not taking prescribed medications regularly due to remembering to take.          Pain History:  When did you first notice your pain? Chronic-Back Neck, shoulders    Have you seen this provider for your pain in the past? Yes   Where in your body do you have pain? Back, Neck, Shoulders -new left knee pain   Are you seeing anyone else for your pain? No        7/22/2024     2:34 PM 3/4/2025     1:07 PM 5/30/2025     1:04 PM   PHQ-9 SCORE   PHQ-9 Total Score MyChart 3 (Minimal depression) 1 (Minimal depression) 3 (Minimal depression)   PHQ-9 Total Score 3 1  3        Patient-reported           7/22/2024     2:35 PM 3/4/2025     1:08 PM 5/30/2025     1:05 PM   MIRELA-7 SCORE   Total Score 0 (minimal anxiety) 1 (minimal anxiety) 1 (minimal anxiety)   Total Score 0 1  1        Patient-reported               7/22/2024     2:34 PM 3/4/2025     1:07 PM 5/30/2025     1:04 PM   PHQ-9 SCORE   PHQ-9 Total Score MyChart 3 (Minimal depression) 1 (Minimal depression) 3 (Minimal depression)   PHQ-9 Total Score 3 1  3        Patient-reported           7/22/2024     2:35 PM 3/4/2025     1:08 PM 5/30/2025     1:05 PM   MIRELA-7 SCORE   Total Score 0 (minimal anxiety) 1  "(minimal anxiety) 1 (minimal anxiety)   Total Score 0 1  1        Patient-reported           12/5/2024     3:35 PM 3/4/2025     1:31 PM   PEG Score   PEG Total Score 5 5       Chronic Pain Follow Up:    Location of pain: Back, Neck, Shoulders  Left Knee-This is more recent, however  Analgesia/pain control:    - Recent changes:  None    - Overall control: Tolerable with discomfort    - Current treatments: None   Adherence:     - Do you ever take more pain medicine than prescribed? No    - When did you take your last dose of pain medicine?  Yesterday   Adverse effects: No   PDMP Review         Value Time User    State PDMP site checked  Yes 3/4/2025  2:01 PM Faby Turcios APRN CNP          Last CSA Agreement:   CSA -- Patient Level:     [Media Unavailable] Controlled Substance Agreement - Opioid - Scan on 12/5/2024  4:04 PM   [Media Unavailable] Controlled Substance Agreement - Opioid - Scan on 11/29/2023 12:46 PM       Last UDS: 6/18/2024    Having complaints of knee pain, this is new.  Current pain medications do not seem to be relieving pain.  Some mild right flank pain, daughter reports urine has been frothy in appearance.  No pain with urination.  Reports his diabetes has been \"okay\".  Does not bring meter in with him today.      Objective    /70 (BP Location: Right arm, Patient Position: Chair, Cuff Size: Adult Large)   Pulse 64   Temp 97  F (36.1  C) (Temporal)   Resp 18   Ht 1.778 m (5' 10\")   Wt 102.2 kg (225 lb 6.4 oz)   SpO2 95%   BMI 32.34 kg/m    Body mass index is 32.34 kg/m .  Physical Exam   GENERAL: alert and no distress  EYES: Eyes grossly normal to inspection, PERRL and conjunctivae and sclerae normal  HENT: ear canals and TM's normal, nose and mouth without ulcers or lesions  NECK: no adenopathy, no asymmetry, masses, or scars  RESP: lungs clear to auscultation - no rales, rhonchi or wheezes  CV: regular rate and rhythm, normal S1 S2, no S3 or S4, no murmur, click or rub, no " peripheral edema  ABDOMEN: soft, nontender, no hepatosplenomegaly, no masses and bowel sounds normal  MS: no gross musculoskeletal defects noted, left knee crepitus  SKIN: no suspicious lesions or rashes  NEURO: Normal strength and tone, mentation intact and speech normal  PSYCH: mentation appears normal, affect normal/bright    Results for orders placed or performed during the hospital encounter of 05/30/25   XR Knee Left 1/2 Views     Status: None    Narrative    Exam: XR KNEE LEFT 1/2 VIEWS     History:Male, age 79 years, Acute pain of left knee    Comparison:  No relevant prior imaging.    Technique: Three views are submitted.    Findings: Bones are normally mineralized. No evidence of acute or  subacute fracture.  No evidence of dislocation.  There is a 1.5 x 2.7  x 2.1 cm exostosis seen along the anteromedial aspect of the proximal  tibia suggesting the presence of an enthesophyte at the insertion of  the gracilis or semitendinosis tendons.           Impression    Impression:  No evidence of acute or subacute bony abnormality.     Mild tricompartmental degenerative change.    Exostosis along the anteromedial aspect of the proximal tibia  suggesting an enthesophyte at the wrist shows or semitendinosis  insertion.    LUCILA BYERS MD         SYSTEM ID:  [id number]   Results for orders placed or performed in visit on 05/30/25   Hemoglobin A1c     Status: Abnormal   Result Value Ref Range    Estimated Average Glucose 160 (H) <117 mg/dL    Hemoglobin A1C 7.2 (H) <5.7 %   Albumin Random Urine Quantitative with Creat Ratio     Status: Abnormal   Result Value Ref Range    Creatinine Urine mg/dL 97.0 mg/dL    Albumin Urine mg/L 21.8 mg/L    Albumin Urine mg/g Cr 22.47 (H) 0.00 - 17.00 mg/g Cr   UA Macroscopic with reflex to Microscopic and Culture     Status: Normal    Specimen: Urine, NOS   Result Value Ref Range    Color Urine Light Yellow Colorless, Straw, Light Yellow, Yellow    Appearance Urine Clear Clear     Glucose Urine Negative Negative mg/dL    Bilirubin Urine Negative Negative    Ketones Urine Negative Negative mg/dL    Specific Gravity Urine 1.012 1.000 - 1.030    Blood Urine Negative Negative    pH Urine 5.5 5.0 - 9.0    Protein Albumin Urine Negative Negative mg/dL    Urobilinogen Urine Normal Normal mg/dL    Nitrite Urine Negative Negative    Leukocyte Esterase Urine Negative Negative    Narrative    Microscopic not indicated             Signed Electronically by: SHANA Venegas CNP

## 2025-06-02 ENCOUNTER — RESULTS FOLLOW-UP (OUTPATIENT)
Dept: FAMILY MEDICINE | Facility: OTHER | Age: 80
End: 2025-06-02

## 2025-06-11 DIAGNOSIS — F22 DELUSIONAL DISORDER (H): ICD-10-CM

## 2025-06-12 ENCOUNTER — MYC MEDICAL ADVICE (OUTPATIENT)
Dept: FAMILY MEDICINE | Facility: OTHER | Age: 80
End: 2025-06-12
Payer: COMMERCIAL

## 2025-06-12 DIAGNOSIS — F22 DELUSIONAL DISORDER (H): ICD-10-CM

## 2025-06-12 RX ORDER — NORTRIPTYLINE HYDROCHLORIDE 10 MG/1
10 CAPSULE ORAL AT BEDTIME
Qty: 90 CAPSULE | Refills: 3 | Status: SHIPPED | OUTPATIENT
Start: 2025-06-12

## 2025-06-12 NOTE — TELEPHONE ENCOUNTER
Out of medication.     Requested Prescriptions   Pending Prescriptions Disp Refills    nortriptyline (PAMELOR) 10 MG capsule 90 capsule 0     Sig: Take 1 capsule (10 mg) by mouth at bedtime.       There is no refill protocol information for this order          Last Prescription Date:   4/23/25  Last Fill Qty/Refills:         90, R-0    Last Office Visit:              5/30/25   Future Office visit:             Next 5 appointments (look out 90 days)      Aug 29, 2025 1:15 PM  (Arrive by 1:00 PM)  Provider Visit with SHANA Lynn [facility] ([facility]) [address]  [place] [address] [phone]             Routing to provider to review and respond.  David Mukherjee RN on 6/12/2025 at 2:34 PM

## 2025-06-16 DIAGNOSIS — F34.1 DYSTHYMIA: ICD-10-CM

## 2025-06-16 RX ORDER — NORTRIPTYLINE HYDROCHLORIDE 10 MG/1
10 CAPSULE ORAL AT BEDTIME
Qty: 90 CAPSULE | Refills: 0 | OUTPATIENT
Start: 2025-06-16

## 2025-06-18 NOTE — TELEPHONE ENCOUNTER
[id number] sent Rx request for the following:      Requested Prescriptions   Pending Prescriptions Disp Refills    FLUoxetine (PROZAC) 40 MG capsule [Pharmacy Med Name: FLUOXETINE 40MG CAPSULE] 90 capsule 0     Sig: TAKE 1 CAPSULE BY MOUTH EVERY MORNING       SSRIs Protocol Failed - 6/18/2025  1:32 PM        Failed - Medication is active on med list and the sig matches. RN to manually verify dose and sig if red X/fail.      Last Prescription Date:   3/25/2025  Last Fill Qty/Refills:         90, R-0    Last Office Visit:                 Future Office visit:           8/29/2025    Unable to complete prescription refill per RN Medication Refill Policy.   Will route to pcp to review and approve  Mic Marroquin RN on 6/18/2025 at 1:39 PM

## 2025-06-19 RX ORDER — FLUOXETINE HYDROCHLORIDE 40 MG/1
CAPSULE ORAL
Qty: 90 CAPSULE | Refills: 0 | Status: SHIPPED | OUTPATIENT
Start: 2025-06-19

## 2025-06-25 DIAGNOSIS — M10.9 GOUT, UNSPECIFIED CAUSE, UNSPECIFIED CHRONICITY, UNSPECIFIED SITE: ICD-10-CM

## 2025-06-25 RX ORDER — ALLOPURINOL 100 MG/1
100 TABLET ORAL DAILY
Qty: 90 TABLET | Refills: 0 | OUTPATIENT
Start: 2025-06-25

## 2025-06-29 ENCOUNTER — MYC REFILL (OUTPATIENT)
Dept: FAMILY MEDICINE | Facility: OTHER | Age: 80
End: 2025-06-29
Payer: COMMERCIAL

## 2025-06-29 DIAGNOSIS — M10.9 GOUT, UNSPECIFIED CAUSE, UNSPECIFIED CHRONICITY, UNSPECIFIED SITE: ICD-10-CM

## 2025-06-29 DIAGNOSIS — Z00.00 HEALTH CARE MAINTENANCE: ICD-10-CM

## 2025-07-01 RX ORDER — MAGNESIUM OXIDE 400 MG/1
400 TABLET ORAL
Qty: 90 TABLET | Refills: 2 | OUTPATIENT
Start: 2025-07-01

## 2025-07-02 ENCOUNTER — TELEPHONE (OUTPATIENT)
Dept: PHARMACY | Facility: OTHER | Age: 80
End: 2025-07-02
Payer: COMMERCIAL

## 2025-07-02 RX ORDER — ALLOPURINOL 100 MG/1
100 TABLET ORAL DAILY
Qty: 90 TABLET | Refills: 0 | OUTPATIENT
Start: 2025-07-02

## 2025-07-02 NOTE — TELEPHONE ENCOUNTER
Redundant refill request refused: Too soon:  Was filled 6/27/2025  Mic Marroquin RN on 7/2/2025 at 8:10 AM

## 2025-07-02 NOTE — TELEPHONE ENCOUNTER
[facility] Recruitment: [facility]  insurance     Referral outreach attempt #4 on July 2, 2025      Outcome: left voicemail- Call back number [phone] and MyChart message sent    [facility]

## 2025-07-24 DIAGNOSIS — M10.9 GOUT, UNSPECIFIED CAUSE, UNSPECIFIED CHRONICITY, UNSPECIFIED SITE: ICD-10-CM

## 2025-07-30 RX ORDER — ALLOPURINOL 100 MG/1
100 TABLET ORAL DAILY
Qty: 90 TABLET | Refills: 0 | OUTPATIENT
Start: 2025-07-30

## 2025-07-30 NOTE — TELEPHONE ENCOUNTER
[id number] sent Rx request for the following:      Requested Prescriptions   Pending Prescriptions Disp Refills    allopurinol (ZYLOPRIM) 100 MG tablet [Pharmacy Med Name: ALLOPURINOL 100MG TABLET] 90 tablet 0     Sig: TAKE 1 TABLET (100 MG) BY MOUTH DAILY.       Gout Agents Protocol Failed - 7/30/2025  1:48 PM        Failed - Has GFR on file in past 12 months and most recent value is normal     Recent Labs   Lab Test 12/05/24  1602   GFRESTIMATED 49*            Last Prescription Date:   6/27/25   Patient has follow-up with PCP in August - additional refills to be addressed at that time.   Last Fill Qty/Refills:         90, R-0    Last Office Visit:                Future Office visit:             Next 5 appointments (look out 90 days)      Aug 29, 2025 1:15 PM  (Arrive by 1:00 PM)  Provider Visit with SHANA Lynn [facility] ([facility]) [address]  [place] [address] [phone]           If taking as directed patient would run out around 9/27/25. Patient has appointment 8/29/25 and can discuss refills then. Pharmacy notified. Ricki Raines RN on 7/30/2025 at 1:52 PM

## 2025-08-29 ENCOUNTER — MYC MEDICAL ADVICE (OUTPATIENT)
Dept: FAMILY MEDICINE | Facility: OTHER | Age: 80
End: 2025-08-29

## 2025-08-29 ENCOUNTER — OFFICE VISIT (OUTPATIENT)
Dept: FAMILY MEDICINE | Facility: OTHER | Age: 80
End: 2025-08-29
Attending: NURSE PRACTITIONER
Payer: MEDICARE

## 2025-08-29 VITALS
BODY MASS INDEX: 32.07 KG/M2 | OXYGEN SATURATION: 96 % | RESPIRATION RATE: 20 BRPM | DIASTOLIC BLOOD PRESSURE: 76 MMHG | HEIGHT: 70 IN | HEART RATE: 64 BPM | WEIGHT: 224 LBS | SYSTOLIC BLOOD PRESSURE: 130 MMHG

## 2025-08-29 DIAGNOSIS — E78.5 HYPERLIPIDEMIA LDL GOAL <70: ICD-10-CM

## 2025-08-29 DIAGNOSIS — K21.9 GASTROESOPHAGEAL REFLUX DISEASE WITHOUT ESOPHAGITIS: ICD-10-CM

## 2025-08-29 DIAGNOSIS — I25.118 CORONARY ARTERY DISEASE OF NATIVE ARTERY OF NATIVE HEART WITH STABLE ANGINA PECTORIS: ICD-10-CM

## 2025-08-29 DIAGNOSIS — E11.69 TYPE 2 DIABETES MELLITUS WITH OTHER SPECIFIED COMPLICATION, WITH LONG-TERM CURRENT USE OF INSULIN (H): ICD-10-CM

## 2025-08-29 DIAGNOSIS — F34.1 DYSTHYMIA: ICD-10-CM

## 2025-08-29 DIAGNOSIS — Z95.5 HISTORY OF CORONARY ARTERY STENT PLACEMENT: ICD-10-CM

## 2025-08-29 DIAGNOSIS — Z79.4 TYPE 2 DIABETES MELLITUS WITH OTHER SPECIFIED COMPLICATION, WITH LONG-TERM CURRENT USE OF INSULIN (H): ICD-10-CM

## 2025-08-29 DIAGNOSIS — M10.9 GOUT, UNSPECIFIED CAUSE, UNSPECIFIED CHRONICITY, UNSPECIFIED SITE: ICD-10-CM

## 2025-08-29 DIAGNOSIS — Z98.890 S/P CORONARY ANGIOGRAM: ICD-10-CM

## 2025-08-29 DIAGNOSIS — Z00.00 HEALTH CARE MAINTENANCE: ICD-10-CM

## 2025-08-29 DIAGNOSIS — G20.A2 PARKINSON'S DISEASE WITH FLUCTUATING MANIFESTATIONS, UNSPECIFIED WHETHER DYSKINESIA PRESENT (H): ICD-10-CM

## 2025-08-29 DIAGNOSIS — E11.51 TYPE 2 DIABETES MELLITUS WITH PERIPHERAL VASCULAR DISEASE (H): ICD-10-CM

## 2025-08-29 DIAGNOSIS — I10 PRIMARY HYPERTENSION: ICD-10-CM

## 2025-08-29 DIAGNOSIS — E03.9 HYPOTHYROIDISM, UNSPECIFIED TYPE: ICD-10-CM

## 2025-08-29 DIAGNOSIS — Z00.00 ENCOUNTER FOR MEDICARE ANNUAL WELLNESS EXAM: Primary | ICD-10-CM

## 2025-08-29 DIAGNOSIS — F11.20 CONTINUOUS OPIOID DEPENDENCE (H): ICD-10-CM

## 2025-08-29 DIAGNOSIS — E53.8 VITAMIN B12 DEFICIENCY (NON ANEMIC): ICD-10-CM

## 2025-08-29 DIAGNOSIS — F11.20 CHRONIC NARCOTIC DEPENDENCE (H): ICD-10-CM

## 2025-08-29 DIAGNOSIS — F22 DELUSIONAL DISORDER (H): ICD-10-CM

## 2025-08-29 LAB
ALBUMIN SERPL BCG-MCNC: 4.2 G/DL (ref 3.5–5.2)
ALP SERPL-CCNC: 104 U/L (ref 40–150)
ALT SERPL W P-5'-P-CCNC: 22 U/L (ref 0–70)
ANION GAP SERPL CALCULATED.3IONS-SCNC: 10 MMOL/L (ref 7–15)
AST SERPL W P-5'-P-CCNC: 20 U/L (ref 0–45)
BILIRUB SERPL-MCNC: 0.8 MG/DL
BUN SERPL-MCNC: 22.8 MG/DL (ref 8–23)
CALCIUM SERPL-MCNC: 9.2 MG/DL (ref 8.8–10.4)
CHLORIDE SERPL-SCNC: 103 MMOL/L (ref 98–107)
CHOLEST SERPL-MCNC: 123 MG/DL
CREAT SERPL-MCNC: 1.58 MG/DL (ref 0.67–1.17)
CREAT UR-MCNC: 168 MG/DL
EGFRCR SERPLBLD CKD-EPI 2021: 44 ML/MIN/1.73M2
EST. AVERAGE GLUCOSE BLD GHB EST-MCNC: 163 MG/DL
FASTING STATUS PATIENT QL REPORTED: ABNORMAL
FASTING STATUS PATIENT QL REPORTED: NORMAL
GLUCOSE SERPL-MCNC: 157 MG/DL (ref 70–99)
HBA1C MFR BLD: 7.3 %
HCO3 SERPL-SCNC: 27 MMOL/L (ref 22–29)
HDLC SERPL-MCNC: 46 MG/DL
LDLC SERPL CALC-MCNC: 51 MG/DL
NONHDLC SERPL-MCNC: 77 MG/DL
POTASSIUM SERPL-SCNC: 4.5 MMOL/L (ref 3.4–5.3)
PROT SERPL-MCNC: 7.6 G/DL (ref 6.4–8.3)
SODIUM SERPL-SCNC: 140 MMOL/L (ref 135–145)
TRIGL SERPL-MCNC: 128 MG/DL

## 2025-08-29 PROCEDURE — 83036 HEMOGLOBIN GLYCOSYLATED A1C: CPT | Mod: ZL | Performed by: NURSE PRACTITIONER

## 2025-08-29 PROCEDURE — 80358 DRUG SCREENING METHADONE: CPT | Mod: ZL | Performed by: NURSE PRACTITIONER

## 2025-08-29 PROCEDURE — 80061 LIPID PANEL: CPT | Mod: ZL | Performed by: NURSE PRACTITIONER

## 2025-08-29 PROCEDURE — G0463 HOSPITAL OUTPT CLINIC VISIT: HCPCS | Performed by: NURSE PRACTITIONER

## 2025-08-29 PROCEDURE — 84075 ASSAY ALKALINE PHOSPHATASE: CPT | Mod: ZL | Performed by: NURSE PRACTITIONER

## 2025-08-29 PROCEDURE — 93005 ELECTROCARDIOGRAM TRACING: CPT | Performed by: NURSE PRACTITIONER

## 2025-08-29 PROCEDURE — 36415 COLL VENOUS BLD VENIPUNCTURE: CPT | Mod: ZL | Performed by: NURSE PRACTITIONER

## 2025-08-29 RX ORDER — OXYCODONE AND ACETAMINOPHEN 5; 325 MG/1; MG/1
1 TABLET ORAL EVERY 12 HOURS PRN
Qty: 60 TABLET | Refills: 0 | Status: SHIPPED | OUTPATIENT
Start: 2025-08-29

## 2025-08-29 RX ORDER — FUROSEMIDE 40 MG/1
40 TABLET ORAL EVERY MORNING
Qty: 90 TABLET | Refills: 4 | Status: SHIPPED | OUTPATIENT
Start: 2025-08-29

## 2025-08-29 RX ORDER — OXYCODONE AND ACETAMINOPHEN 5; 325 MG/1; MG/1
1 TABLET ORAL EVERY 12 HOURS PRN
Qty: 60 TABLET | Refills: 0 | Status: SHIPPED | OUTPATIENT
Start: 2025-10-28

## 2025-08-29 RX ORDER — MAGNESIUM OXIDE 400 MG/1
400 TABLET ORAL
Qty: 90 TABLET | Refills: 4 | Status: SHIPPED | OUTPATIENT
Start: 2025-08-29

## 2025-08-29 RX ORDER — LOSARTAN POTASSIUM 100 MG/1
100 TABLET ORAL DAILY
Qty: 90 TABLET | Refills: 4 | Status: SHIPPED | OUTPATIENT
Start: 2025-08-29

## 2025-08-29 RX ORDER — ALLOPURINOL 100 MG/1
100 TABLET ORAL DAILY
Qty: 90 TABLET | Refills: 4 | Status: SHIPPED | OUTPATIENT
Start: 2025-08-29

## 2025-08-29 RX ORDER — DEXTROAMPHETAMINE SACCHARATE, AMPHETAMINE ASPARTATE MONOHYDRATE, DEXTROAMPHETAMINE SULFATE AND AMPHETAMINE SULFATE 5; 5; 5; 5 MG/1; MG/1; MG/1; MG/1
20 CAPSULE, EXTENDED RELEASE ORAL DAILY
Qty: 30 CAPSULE | Refills: 0 | Status: CANCELLED | OUTPATIENT
Start: 2025-10-28 | End: 2025-11-27

## 2025-08-29 RX ORDER — AMLODIPINE BESYLATE 10 MG/1
10 TABLET ORAL DAILY
Qty: 90 TABLET | Refills: 2 | Status: SHIPPED | OUTPATIENT
Start: 2025-08-29

## 2025-08-29 RX ORDER — ATORVASTATIN CALCIUM 40 MG/1
40 TABLET, FILM COATED ORAL AT BEDTIME
Qty: 90 TABLET | Refills: 4 | Status: SHIPPED | OUTPATIENT
Start: 2025-08-29

## 2025-08-29 RX ORDER — LANOLIN ALCOHOL/MO/W.PET/CERES
1000 CREAM (GRAM) TOPICAL DAILY
Qty: 30 TABLET | Refills: 4 | Status: SHIPPED | OUTPATIENT
Start: 2025-08-29

## 2025-08-29 RX ORDER — ASPIRIN 81 MG/1
81 TABLET ORAL DAILY
Qty: 90 TABLET | Refills: 4 | Status: SHIPPED | OUTPATIENT
Start: 2025-08-29

## 2025-08-29 RX ORDER — DEXTROAMPHETAMINE SACCHARATE, AMPHETAMINE ASPARTATE MONOHYDRATE, DEXTROAMPHETAMINE SULFATE AND AMPHETAMINE SULFATE 5; 5; 5; 5 MG/1; MG/1; MG/1; MG/1
20 CAPSULE, EXTENDED RELEASE ORAL DAILY
Qty: 30 CAPSULE | Refills: 0 | Status: CANCELLED | OUTPATIENT
Start: 2025-09-28 | End: 2025-10-28

## 2025-08-29 RX ORDER — PANTOPRAZOLE SODIUM 20 MG/1
20 TABLET, DELAYED RELEASE ORAL DAILY
Qty: 90 TABLET | Refills: 4 | Status: SHIPPED | OUTPATIENT
Start: 2025-08-29

## 2025-08-29 RX ORDER — FOLIC ACID 1 MG/1
1000 TABLET ORAL DAILY
Qty: 90 TABLET | Refills: 4 | Status: SHIPPED | OUTPATIENT
Start: 2025-08-29

## 2025-08-29 RX ORDER — FLUOXETINE HYDROCHLORIDE 40 MG/1
40 CAPSULE ORAL DAILY
Qty: 90 CAPSULE | Refills: 4 | Status: SHIPPED | OUTPATIENT
Start: 2025-08-29

## 2025-08-29 RX ORDER — CARVEDILOL 6.25 MG/1
6.25 TABLET ORAL 2 TIMES DAILY WITH MEALS
Qty: 180 TABLET | Refills: 4 | Status: SHIPPED | OUTPATIENT
Start: 2025-08-29

## 2025-08-29 RX ORDER — ROPINIROLE 2 MG/1
2 TABLET, FILM COATED ORAL AT BEDTIME
Qty: 90 TABLET | Refills: 4 | Status: SHIPPED | OUTPATIENT
Start: 2025-08-29

## 2025-08-29 RX ORDER — OXYCODONE AND ACETAMINOPHEN 5; 325 MG/1; MG/1
1 TABLET ORAL EVERY 12 HOURS PRN
Qty: 60 TABLET | Refills: 0 | Status: SHIPPED | OUTPATIENT
Start: 2025-09-28

## 2025-08-29 RX ORDER — LEVOTHYROXINE SODIUM 200 UG/1
200 TABLET ORAL DAILY
Qty: 90 TABLET | Refills: 4 | Status: SHIPPED | OUTPATIENT
Start: 2025-08-29

## 2025-08-29 RX ORDER — NORTRIPTYLINE HYDROCHLORIDE 10 MG/1
10 CAPSULE ORAL AT BEDTIME
Qty: 90 CAPSULE | Refills: 3 | Status: SHIPPED | OUTPATIENT
Start: 2025-08-29

## 2025-08-29 RX ORDER — DULAGLUTIDE 1.5 MG/.5ML
1.5 INJECTION, SOLUTION SUBCUTANEOUS
Qty: 6 ML | Refills: 4 | Status: SHIPPED | OUTPATIENT
Start: 2025-08-29

## 2025-08-29 RX ORDER — DEXTROAMPHETAMINE SACCHARATE, AMPHETAMINE ASPARTATE MONOHYDRATE, DEXTROAMPHETAMINE SULFATE AND AMPHETAMINE SULFATE 5; 5; 5; 5 MG/1; MG/1; MG/1; MG/1
20 CAPSULE, EXTENDED RELEASE ORAL DAILY
Qty: 30 CAPSULE | Refills: 0 | Status: CANCELLED | OUTPATIENT
Start: 2025-08-29 | End: 2025-09-28

## 2025-08-29 SDOH — HEALTH STABILITY: PHYSICAL HEALTH: ON AVERAGE, HOW MANY MINUTES DO YOU ENGAGE IN EXERCISE AT THIS LEVEL?: 120 MIN

## 2025-08-29 SDOH — HEALTH STABILITY: PHYSICAL HEALTH: ON AVERAGE, HOW MANY DAYS PER WEEK DO YOU ENGAGE IN MODERATE TO STRENUOUS EXERCISE (LIKE A BRISK WALK)?: 3 DAYS

## 2025-08-29 ASSESSMENT — ANXIETY QUESTIONNAIRES
1. FEELING NERVOUS, ANXIOUS, OR ON EDGE: NOT AT ALL
GAD7 TOTAL SCORE: 1
IF YOU CHECKED OFF ANY PROBLEMS ON THIS QUESTIONNAIRE, HOW DIFFICULT HAVE THESE PROBLEMS MADE IT FOR YOU TO DO YOUR WORK, TAKE CARE OF THINGS AT HOME, OR GET ALONG WITH OTHER PEOPLE: SOMEWHAT DIFFICULT
GAD7 TOTAL SCORE: 1
6. BECOMING EASILY ANNOYED OR IRRITABLE: NOT AT ALL
7. FEELING AFRAID AS IF SOMETHING AWFUL MIGHT HAPPEN: NOT AT ALL
5. BEING SO RESTLESS THAT IT IS HARD TO SIT STILL: NOT AT ALL
2. NOT BEING ABLE TO STOP OR CONTROL WORRYING: NOT AT ALL
4. TROUBLE RELAXING: SEVERAL DAYS
GAD7 TOTAL SCORE: 1
7. FEELING AFRAID AS IF SOMETHING AWFUL MIGHT HAPPEN: NOT AT ALL
3. WORRYING TOO MUCH ABOUT DIFFERENT THINGS: NOT AT ALL
8. IF YOU CHECKED OFF ANY PROBLEMS, HOW DIFFICULT HAVE THESE MADE IT FOR YOU TO DO YOUR WORK, TAKE CARE OF THINGS AT HOME, OR GET ALONG WITH OTHER PEOPLE?: SOMEWHAT DIFFICULT

## 2025-08-29 ASSESSMENT — PATIENT HEALTH QUESTIONNAIRE - PHQ9
SUM OF ALL RESPONSES TO PHQ QUESTIONS 1-9: 3
10. IF YOU CHECKED OFF ANY PROBLEMS, HOW DIFFICULT HAVE THESE PROBLEMS MADE IT FOR YOU TO DO YOUR WORK, TAKE CARE OF THINGS AT HOME, OR GET ALONG WITH OTHER PEOPLE: SOMEWHAT DIFFICULT
SUM OF ALL RESPONSES TO PHQ QUESTIONS 1-9: 3

## 2025-08-29 ASSESSMENT — PAIN SCALES - GENERAL: PAINLEVEL_OUTOF10: MODERATE PAIN (5)

## 2025-08-30 LAB
ATRIAL RATE - MUSE: 61 BPM
DIASTOLIC BLOOD PRESSURE - MUSE: NORMAL MMHG
INTERPRETATION ECG - MUSE: NORMAL
P AXIS - MUSE: 60 DEGREES
PR INTERVAL - MUSE: 208 MS
QRS DURATION - MUSE: 184 MS
QT - MUSE: 470 MS
QTC - MUSE: 473 MS
R AXIS - MUSE: -69 DEGREES
SYSTOLIC BLOOD PRESSURE - MUSE: NORMAL MMHG
T AXIS - MUSE: -15 DEGREES
VENTRICULAR RATE- MUSE: 61 BPM

## 2025-09-03 ENCOUNTER — MYC MEDICAL ADVICE (OUTPATIENT)
Dept: FAMILY MEDICINE | Facility: OTHER | Age: 80
End: 2025-09-03
Payer: COMMERCIAL

## (undated) RX ORDER — REGADENOSON 0.08 MG/ML
INJECTION, SOLUTION INTRAVENOUS
Status: DISPENSED
Start: 2024-06-25